# Patient Record
Sex: MALE | Race: WHITE
[De-identification: names, ages, dates, MRNs, and addresses within clinical notes are randomized per-mention and may not be internally consistent; named-entity substitution may affect disease eponyms.]

---

## 2020-08-11 ENCOUNTER — HOSPITAL ENCOUNTER (EMERGENCY)
Dept: HOSPITAL 41 - JD.ED | Age: 80
Discharge: HOME | End: 2020-08-11
Payer: MEDICARE

## 2020-08-11 DIAGNOSIS — Z72.3: ICD-10-CM

## 2020-08-11 DIAGNOSIS — R56.9: ICD-10-CM

## 2020-08-11 DIAGNOSIS — Z79.899: ICD-10-CM

## 2020-08-11 DIAGNOSIS — M62.81: ICD-10-CM

## 2020-08-11 DIAGNOSIS — Z04.3: Primary | ICD-10-CM
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
## 2020-08-11 NOTE — EDM.PDOC
ED HPI GENERAL MEDICAL PROBLEM





- General


Chief Complaint: General


Stated Complaint: [facility]


Time Seen by Provider: 08/11/20 11:48


Source of Information: Reports: Patient, Nursing Home Records (report from 

[facility]), RN Notes Reviewed


History Limitations: Reports: No Limitations





- History of Present Illness


INITIAL COMMENTS - FREE TEXT/NARRATIVE: 





Patient is a 79-year-old male who presents to the ED for the evaluation of a 

fall.  Patient was brought by [place] ambulance service, and resides at 

[facility] in [place].  Patient is not a great historian, he 

does provide some information, like he fell in the elevator, but he denies any 

sort of ongoing illness that he has been having.  Report from [facility] staff 

states that he has had generalized weakness for the past couple weeks, and has 

been going to physical therapy for this.  He did collapse or fall in the 

elevator today, but is denying pain anywhere, and is not on any sort of blood 

thinners, and he was not known to have hit his head.  Staff note that he is 

normally alert and oriented x3.  On initial exam, the patient does answer 

questions when prompted, sometimes he answers questions correctly, and other 

times he answers with a complete off the cuff answer.  He does have weakened 

 strength bilaterally, and states he just feeling generally more weak.  He 

thinks his legs just gave out on him today, noted it was a little bit harder to 

get up off the floor.  He notes lingering problems with his balance, as he has a

history of seizures.  Patient's not been known to have any fevers or chills, no 

cough or shortness of breath, or any sort of nausea vomiting or diarrhea.





- Related Data


                                    Allergies











Allergy/AdvReac Type Severity Reaction Status Date / Time


 


No Known Allergies Allergy   Verified 08/11/20 11:54











Home Meds: 


                                    Home Meds





Phenytoin Sodium Extended [Dilantin] 100 - 150 mg PO BID 08/11/20 [History]


Primidone [Mysoline] 250 mg PO BID 08/11/20 [History]


lisinopriL [Lisinopril] 20 mg PO DAILY 08/11/20 [History]











Past Medical History


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


Neurological History: Reports: Seizure





Social & Family History





- Living Situation & Occupation


Living situation: Reports: Extended Care Facility ([facility])





ED ROS GENERAL





- Review of Systems


Review Of Systems: Comprehensive ROS is negative, except as noted in HPI.





ED EXAM, GENERAL





- Physical Exam


Exam: See Below


Exam Limited By: No Limitations


General Appearance: Alert, WD/WN, No Apparent Distress


Ears: Normal External Exam


Nose: Normal Inspection


Throat/Mouth: Normal Inspection


Head: Atraumatic, Normocephalic


Neck: Normal Inspection


Respiratory/Chest: No Respiratory Distress, Lungs Clear, Normal Breath Sounds, 

No Accessory Muscle Use, Chest Non-Tender


Cardiovascular: Normal Peripheral Pulses, Regular Rate, Rhythm, No Murmur


Peripheral Pulses: 2+: Radial (L), Radial (R)


Extremities: Normal Inspection, Normal Capillary Refill


Neurological: Alert, Oriented, Sensory/Motor Deficit (decreased  strength 

bilaterally, overall generalized weakness noted)


Psychiatric: Normal Affect, Normal Mood


Skin Exam: Warm, Dry, Intact, Normal Color, No Rash





EKG INTERPRETATION


EKG Date: 08/11/20


Time: 12:36


Rhythm: NSR (sinus bradycardia)


Rate (Beats/Min): 52


Axis: Normal


P-Wave: Present (1 AV block)


QRS: Normal


ST-T: Other (T-wave flattening in lead I and inverted in aVL)


QT: Normal


Comparison: NA - No Prior EKG


EKG Interpretation Comments: 





No obvious ischemia or acute ST changes noted, reviewed by myself and Dr. Dunn.





Course





- Vital Signs


Last Recorded V/S: 


                                Last Vital Signs











Temp  98.3 F   08/11/20 11:47


 


Pulse  62   08/11/20 11:47


 


Resp  18   08/11/20 11:47


 


BP  137/75   08/11/20 11:47


 


Pulse Ox  94 L  08/11/20 11:47














- Orders/Labs/Meds


Orders: 


                               Active Orders 24 hr











 Category Date Time Status


 


 EKG Documentation Completion [RC] STAT Care  08/11/20 11:56 Active


 


 Peripheral IV Care [RC] .AS DIRECTED Care  08/11/20 12:07 Active


 


 Chest 2V [CR] Stat Exams  08/11/20 11:55 Taken


 


 Sodium Chloride 0.9% [Saline Flush] Med  08/11/20 12:06 Active





 10 ml FLUSH ASDIRECTED PRN   


 


 Peripheral IV Insertion Adult [OM.PC] Routine Oth  08/11/20 12:07 Ordered








                                Medication Orders





Sodium Chloride (Saline Flush)  10 ml FLUSH ASDIRECTED PRN


   PRN Reason: Keep Vein Open








Labs: 


                                Laboratory Tests











  08/11/20 08/11/20 08/11/20 Range/Units





  12:23 12:23 13:29 


 


WBC  3.88 L    (4.23-9.07)  K/mm3


 


RBC  4.27 L    (4.63-6.08)  M/mm3


 


Hgb  12.7 L    (13.7-17.5)  gm/dl


 


Hct  39.6 L    (40.1-51.0)  %


 


MCV  92.7 H    (79.0-92.2)  fl


 


MCH  29.7    (25.7-32.2)  pg


 


MCHC  32.1 L    (32.2-35.5)  g/dl


 


RDW Std Deviation  47.5 H    (35.1-43.9)  fL


 


Plt Count  180    (163-337)  K/mm3


 


MPV  11.6    (9.4-12.3)  fl


 


Neut % (Auto)  66.2    (34.0-67.9)  %


 


Lymph % (Auto)  19.1 L    (21.8-53.1)  %


 


Mono % (Auto)  11.3    (5.3-12.2)  %


 


Eos % (Auto)  2.8    (0.8-7.0)  


 


Baso % (Auto)  0.3    (0.1-1.2)  %


 


Neut # (Auto)  2.57    (1.78-5.38)  K/mm3


 


Lymph # (Auto)  0.74 L    (1.32-3.57)  K/mm3


 


Mono # (Auto)  0.44    (0.30-0.82)  K/mm3


 


Eos # (Auto)  0.11    (0.04-0.54)  K/mm3


 


Baso # (Auto)  0.01    (0.01-0.08)  K/mm3


 


Sodium   139   (136-145)  mEq/L


 


Potassium   4.3   (3.5-5.1)  mEq/L


 


Chloride   104   ()  mEq/L


 


Carbon Dioxide   30   (21-32)  mEq/L


 


Anion Gap   9.3   (5-15)  


 


BUN   16   (7-18)  mg/dL


 


Creatinine   0.9   (0.7-1.3)  mg/dL


 


Est Cr Clr Drug Dosing   65.97   mL/min


 


Estimated GFR (MDRD)   > 60   (>60)  mL/min


 


BUN/Creatinine Ratio   17.8   (14-18)  


 


Glucose   92   ()  mg/dL


 


Calcium   8.6   (8.5-10.1)  mg/dL


 


Magnesium   1.8   (1.8-2.4)  mg/dl


 


Total Bilirubin   0.3   (0.2-1.0)  mg/dL


 


AST   23   (15-37)  U/L


 


ALT   25   (16-63)  U/L


 


Alkaline Phosphatase   120 H   ()  U/L


 


Troponin I   < 0.017   (0.00-0.056)  ng/mL


 


Total Protein   6.3 L   (6.4-8.2)  g/dl


 


Albumin   3.3 L   (3.4-5.0)  g/dl


 


Globulin   3.0   gm/dL


 


Albumin/Globulin Ratio   1.1   (1-2)  


 


Urine Color    Yellow  (Yellow)  


 


Urine Appearance    Clear  (Clear)  


 


Urine pH    7.0  (5.0-8.0)  


 


Ur Specific Gravity    1.020  (1.005-1.030)  


 


Urine Protein    Negative  (Negative)  


 


Urine Glucose (UA)    Negative  (Negative)  


 


Urine Ketones    Negative  (Negative)  


 


Urine Occult Blood    Trace-intact H  (Negative)  


 


Urine Nitrite    Negative  (Negative)  


 


Urine Bilirubin    Negative  (Negative)  


 


Urine Urobilinogen    1.0  (0.2-1.0)  


 


Ur Leukocyte Esterase    Negative  (Negative)  


 


Urine RBC    5-10 H  (0-5)  /hpf


 


Urine WBC    0-5  (0-5)  /hpf


 


Ur Squamous Epith Cells    0-5  (0-5)  /hpf


 


Urine Bacteria    Few  (FEW)  /hpf


 


Urine Mucus    Rare  (FEW)  /hpf











Meds: 


Medications











Generic Name Dose Route Start Last Admin





  Trade Name Freq  PRN Reason Stop Dose Admin


 


Sodium Chloride  10 ml  08/11/20 12:06 





  Saline Flush  FLUSH  





  ASDIRECTED PRN  





  Keep Vein Open  














- Re-Assessments/Exams


Free Text/Narrative Re-Assessment/Exam: 





08/11/20 12:14


Patient presents to the ED for the evaluation of a fall, and generalized 

weakness.  Laboratory evaluation be obtained to rule out any electrolyte 

abnormalities, patient will have a chest x-ray, EKG, along with a urinalysis to 

rule out further possible infectious processes that would be causing increased 

weakness.  I do believe on exam however the patient is suffering from 

generalized deconditioning, or aging.





08/11/20 13:07


Chest x-ray is negative for any acute consolidative processes like pneumonia.  

There is some possible mild atelectasis at the right lung base, and heart is at 

the upper limits of normal for size.  Official radiology read is pending.  X-

rays were reviewed by myself and Dr. Dunn.  Labs are fairly unremarkable, 

white blood cell count is mildly low at 3.88.  Metabolic panel demonstrates no 

electrolyte abnormalities that would be causing weakness.  Trop is negative, EKG

 does not show any sign of acute ST changes.  As stated above I do believe the 

patient suffering from physical deconditioning, however urinalysis is still 

pending at this time.





08/11/20 14:01


Urine demonstrates a trace amount of cells in his urine, which likely secondary 

to trauma due to the insertion of the urinary catheter.  Patient will be 

discharged back to [facility] with general recommendations at this time.











Departure





- Departure


Time of Disposition: 14:02


Disposition: Home, Self-Care 01


Condition: Good


Clinical Impression: 


 Generalized muscle weakness, Physical deconditioning





Fall


Qualifiers:


 Encounter type: initial encounter Qualified Code(s): W19.XXXA - Unspecified 

fall, initial encounter








- Discharge Information


*PRESCRIPTION DRUG MONITORING PROGRAM REVIEWED*: No


*COPY OF PRESCRIPTION DRUG MONITORING REPORT IN PATIENT MOHAN: No


Instructions:  Weakness, Easy-to-Read


Forms:  ED Department Discharge


Additional Instructions: 


You were evaluated in the ER today regarding your generalized weakness, and fall

 x1 today.





An extensive work-up was done today to include lab work, chest x-ray, EKG all of

 this was negative.  There is no sign of an infective process causing any sort 

of weakness or anything that would have attributed to your fall.





Your fall is most likely due to physical deconditioning, and the aging process, 

please continue to work with PT to get stronger at this time.





Please return to the ER at any time if your symptoms change or worsen.





Sepsis Event Note (ED)





- Evaluation


Sepsis Screening Result: No Definite Risk





- Focused Exam


Vital Signs: 


                                   Vital Signs











  Temp Pulse Resp BP Pulse Ox


 


 08/11/20 11:47  98.3 F  62  18  137/75  94 L














- My Orders


Last 24 Hours: 


My Active Orders





08/11/20 11:55


Chest 2V [CR] Stat 





08/11/20 11:56


EKG Documentation Completion [RC] STAT 





08/11/20 12:06


Sodium Chloride 0.9% [Saline Flush]   10 ml FLUSH ASDIRECTED PRN 





08/11/20 12:07


Peripheral IV Care [RC] .AS DIRECTED 


Peripheral IV Insertion Adult [OM.PC] Routine 














- Assessment/Plan


Last 24 Hours: 


My Active Orders





08/11/20 11:55


Chest 2V [CR] Stat 





08/11/20 11:56


EKG Documentation Completion [RC] STAT 





08/11/20 12:06


Sodium Chloride 0.9% [Saline Flush]   10 ml FLUSH ASDIRECTED PRN 





08/11/20 12:07


Peripheral IV Care [RC] .AS DIRECTED 


Peripheral IV Insertion Adult [OM.PC] Routine

## 2020-08-12 NOTE — CR
Chest: 2 views of the chest were obtained.

 

Comparison: Prior chest x-ray of 05/31/13.

 

Heart is slightly enlarged.  Tortuous thoracic aorta is noted.  Slight

 atelectasis is noted within both lung bases.  Lungs otherwise are 

clear with no acute parenchymal change.  Right sided jugular line 

appears to be present.  Bony structures appear osteopenic.  Nothing 

acute is seen.

 

Impression:

1.  Slight bibasilar atelectasis.

2.  Right jugular line appears to be present.

3.  Nothing acute is appreciated.

 

Diagnostic code #2

 

This report was dictated in MDT

## 2020-08-15 ENCOUNTER — HOSPITAL ENCOUNTER (EMERGENCY)
Dept: HOSPITAL 41 - JD.ED | Age: 80
Discharge: HOME | End: 2020-08-15
Payer: MEDICARE

## 2020-08-15 DIAGNOSIS — M62.9: ICD-10-CM

## 2020-08-15 DIAGNOSIS — I10: ICD-10-CM

## 2020-08-15 DIAGNOSIS — R06.02: ICD-10-CM

## 2020-08-15 DIAGNOSIS — H54.3: ICD-10-CM

## 2020-08-15 DIAGNOSIS — M16.0: ICD-10-CM

## 2020-08-15 DIAGNOSIS — T42.0X5A: ICD-10-CM

## 2020-08-15 DIAGNOSIS — M17.0: ICD-10-CM

## 2020-08-15 DIAGNOSIS — G93.2: Primary | ICD-10-CM

## 2020-08-15 DIAGNOSIS — Z79.899: ICD-10-CM

## 2020-08-15 PROCEDURE — 93005 ELECTROCARDIOGRAM TRACING: CPT

## 2020-08-15 PROCEDURE — 85025 COMPLETE CBC W/AUTO DIFF WBC: CPT

## 2020-08-15 PROCEDURE — 86140 C-REACTIVE PROTEIN: CPT

## 2020-08-15 PROCEDURE — 84484 ASSAY OF TROPONIN QUANT: CPT

## 2020-08-15 PROCEDURE — 36415 COLL VENOUS BLD VENIPUNCTURE: CPT

## 2020-08-15 PROCEDURE — 80053 COMPREHEN METABOLIC PANEL: CPT

## 2020-08-15 PROCEDURE — 83880 ASSAY OF NATRIURETIC PEPTIDE: CPT

## 2020-08-15 PROCEDURE — 81001 URINALYSIS AUTO W/SCOPE: CPT

## 2020-08-15 PROCEDURE — 96360 HYDRATION IV INFUSION INIT: CPT

## 2020-08-15 PROCEDURE — 83735 ASSAY OF MAGNESIUM: CPT

## 2020-08-15 PROCEDURE — 99285 EMERGENCY DEPT VISIT HI MDM: CPT

## 2020-08-15 PROCEDURE — 70450 CT HEAD/BRAIN W/O DYE: CPT

## 2020-08-15 PROCEDURE — 96361 HYDRATE IV INFUSION ADD-ON: CPT

## 2020-08-15 PROCEDURE — 85652 RBC SED RATE AUTOMATED: CPT

## 2020-08-15 PROCEDURE — 80185 ASSAY OF PHENYTOIN TOTAL: CPT

## 2020-08-15 NOTE — EDM.PDOC
ED HPI GENERAL MEDICAL PROBLEM





- General


Chief Complaint: General


Stated Complaint: [facility]


Time Seen by Provider: 08/15/20 15:21


Source of Information: Reports: Patient


History Limitations: Reports: No Limitations





- History of Present Illness


INITIAL COMMENTS - FREE TEXT/NARRATIVE: 


79-year-old male presents to the ED for evaluation of a fall outside.  Patient 

has been falling a lot more recently due to problems with his legs.  He is 

currently in a physical therapy program to try and improve his balance and his 

stamina due to deconditioning of his lower extremities.  By history the patient 

has benign intracranial hypertension and has bur holes in both posterior aspects

of his scalp and left parietal scalp.  He states they replaced 45 years ago.  He

denies any injuries from today's falls.  He was evaluated through the ED a few 

days ago for fall and no ID problems were identified in terms of metabolic 

abnormalities.  He usually walks with the aid of a walker.  Not use a cane.  He 

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 

spontaneous retinal hemorrhage in that eye a many years ago.  She does have a 

seizure disorder and is currently on Dilantin which he states he has been on for

the last 45 years.  No recent changes in dosage.  Patient is also on primidone 

or Mysoline 150 mg twice daily as well.





Onset: Today, Sudden


Onset Date: 08/15/20


Onset Time: 14:00


Duration: Minutes:, Other (No apparent injuries identified.)


Location: Reports: Other (No apparent injuries identified.)


Quality: Reports: Other (Current falls at place of residence [facility].)


Severity: Moderate


Improves with: Reports: None


Worsens with: Reports: Other (And has chronic problems with his balance and)


Context: Denies: Activity, Exercise ( gait due to weakness of the lower 

extremities.), Lifting, Sick Contact, Trauma, Other


Associated Symptoms: Reports: No Other Symptoms.  Denies: Chest Pain, 

Fever/Chills


Treatments PTA: Reports: Other (see below) (1.)





- Related Data


                                    Allergies











Allergy/AdvReac Type Severity Reaction Status Date / Time


 


No Known Allergies Allergy   Verified 08/11/20 11:54











Home Meds: 


                                    Home Meds





Phenytoin Sodium Extended [Dilantin] 100 - 150 mg PO BID 08/11/20 [History]


Primidone [Mysoline] 250 mg PO BID 08/11/20 [History]


lisinopriL [Lisinopril] 20 mg PO DAILY 08/11/20 [History]


Phenytoin Sodium Extended [Dilantin] 150 mg PO QPM 08/15/20 [History]











Past Medical History


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


Cardiovascular History: Reports: Hypertension


Neurological History: Reports: Seizure (History of seizure disorder.  History of

 benign intracranial hypertension and has shunts in bur holes in both posterior 

aspects of his parietal scalp.)





- Past Surgical History


HEENT Surgical History: Reports: Detached Retina





Social & Family History





- Living Situation & Occupation


Living situation: Reports: Extended Care Facility ([facility])





ED ROS GENERAL





- Review of Systems


Review Of Systems: See Below


Constitutional: Reports: Malaise, Weakness, Fatigue, Other (His weight is been 

150 pounds forever.).  Denies: Fever, Chills, Decreased Appetite, Weight Loss


HEENT: Reports: Glasses, Other (9S left eye)


Respiratory: Reports: Shortness of Breath.  Denies: Wheezing, Pleuritic Chest 

Pain, Cough, Sputum


Cardiovascular: Reports: Blood Pressure Problem, Dyspnea on Exertion.  Denies: 

Chest Pain, Claudication, Edema, Lightheadedness, Orthopnea


Endocrine: Reports: Fatigue (Times)


GI/Abdominal: Denies: Constipation, Diarrhea


: Reports: Frequency, Other (.  X3.  Known BPH.)


Musculoskeletal: Reports: Joint Pain (Hips knees low back pain.), Other 

(Difficulty walking due to leg weakness.  Uses a walker to aid his gait)


Skin: Reports: No Symptoms


Neurological: Reports: Difficulty Walking


Psychiatric: Reports: No Symptoms


Hematologic/Lymphatic: Reports: No Symptoms


Immunologic: Reports: No Symptoms





ED EXAM, GENERAL





- Physical Exam


Exam: See Below


Exam Limited By: No Limitations


General Appearance: Alert, WD/WN, No Apparent Distress, Other (Temperature is 

36.6.  Pulse is 93 and sinus respiratory is 18 sats are 95% on room air)


Eye Exam: Left Eye: Abnormal Pupil (Patient has scarring and opaqueness of his 

left cornea and is blind in the left eye after retinal detachment occurred from 

a grand mal convulsion many years ago.), Bilateral Eye: PERRL


Throat/Mouth: Normal Inspection, Normal Lips, Normal Oropharynx


Head: Atraumatic, Normocephalic, Other (He has evidence of bur holes both 

posterior aspects of his parietal scalp.)


Neck: Limited Range of Motion, Tender Lateral (Bilateral aspect of the cervical 

spine due to osteoarthritic change.  He states it is like this all the time.).  

No: Lymphadenopathy (L), Lymphadenopathy (R)


Respiratory/Chest: No Respiratory Distress, Lungs Clear, Normal Breath Sounds, 

No Accessory Muscle Use, Decreased Breath Sounds (Creased breath sounds to the 

lower 35% of lung fields due to his mild COPD clinically.).  No: Rhonchi, 

Wheezing


Cardiovascular: Regular Rate, Rhythm, No Edema, No Gallop, No Murmur, No Rub.  

No: Normal Peripheral Pulses


Peripheral Pulses: 2+: Posterior Tibial (L), Posterior Tibial (R), Dorsalis 

Pedis (L), Dorsalis Pedis (R), 3+: Carotid (L), Carotid (R)


GI/Abdominal: Normal Bowel Sounds, Soft, Non-Tender, No Organomegaly


Back Exam: Normal Inspection, Decreased Range of Motion, Paraspinal Tenderness 

(Stiffness throughout the lumbar spine.  Adjacent to the lumbar spine 

bilaterally.).  No: Full Range of Motion


Extremities: Other (Patient has marked osteoarthritic changes in both hips with 

very limited external and internal rotation on both sides.  He has arthritic 

changes in both knees.  He walks with a slightly widened gait and shuffling type

 gait.)


Neurological: Alert (No real signs of bradykinesia to suggest Parkinson's 

disease.), Oriented, CN II-XII Intact, Normal Cognition


Psychiatric: Normal Affect, Normal Mood


Skin Exam: Warm, Dry, Intact, Normal Color, No Rash





EKG INTERPRETATION


EKG Date: 08/15/20


Time: 15:33


Rhythm: Other


Rate (Beats/Min): 57


Axis: Normal


P-Wave: Present


QRS: Other (Initial poor R wave progression.)


ST-T: Other (Wandering baseline leads I to III, aVL and aVF make it difficult to

 interpret limb leads.)


QT: Normal


EKG Interpretation Comments: 





Abnormal ECG





Course





- Vital Signs


Last Recorded V/S: 


                                Last Vital Signs











Temp  36.6 C   08/15/20 15:04


 


Pulse  93   08/15/20 15:04


 


Resp  18   08/15/20 15:04


 


BP      


 


Pulse Ox  93 L  08/15/20 15:04








                                        





Orthostatic Blood Pressure [     145/99


Standing]                        


Orthostatic Blood Pressure [     141/84


Supine]                          











- Orders/Labs/Meds


Orders: 


                               Active Orders 24 hr











 Category Date Time Status


 


 EKG Documentation Completion [RC] STAT Care  08/15/20 15:23 Active


 


 Orthostatic Vital Signs [RC] ASDIRECTED Care  08/15/20 15:23 Active


 


 Head wo Cont [CT] Stat Exams  08/15/20 15:24 Taken


 


 PHENYTOIN [REF] Stat Lab  08/15/20 15:35 Received


 


 Dextrose 5%-0.9% NaCl [Dextrose 5%-Normal Saline] 1,000 Med  08/15/20 15:30 

Active





 ml   





 IV ASDIRECTED   








                                Medication Orders





Dextrose/Sodium Chloride (Dextrose 5%-Normal Saline)  1,000 mls @ 250 mls/hr IV 

ASDIRECTED LUIS A


   Last Admin: 08/15/20 15:38  Dose: 250 mls/hr


   Documented by: ASTRID








Labs: 


                                Laboratory Tests











  08/15/20 08/15/20 08/15/20 Range/Units





  15:47 15:47 15:47 


 


WBC  3.52 L    (4.23-9.07)  K/mm3


 


RBC  4.45 L    (4.63-6.08)  M/mm3


 


Hgb  13.1 L    (13.7-17.5)  gm/dl


 


Hct  41.2    (40.1-51.0)  %


 


MCV  92.6 H    (79.0-92.2)  fl


 


MCH  29.4    (25.7-32.2)  pg


 


MCHC  31.8 L    (32.2-35.5)  g/dl


 


RDW Std Deviation  47.4 H    (35.1-43.9)  fL


 


Plt Count  179    (163-337)  K/mm3


 


MPV  11.6    (9.4-12.3)  fl


 


Neut % (Auto)  60.0    (34.0-67.9)  %


 


Lymph % (Auto)  22.4    (21.8-53.1)  %


 


Mono % (Auto)  13.9 H    (5.3-12.2)  %


 


Eos % (Auto)  3.1    (0.8-7.0)  


 


Baso % (Auto)  0.3    (0.1-1.2)  %


 


Neut # (Auto)  2.11    (1.78-5.38)  K/mm3


 


Lymph # (Auto)  0.79 L    (1.32-3.57)  K/mm3


 


Mono # (Auto)  0.49    (0.30-0.82)  K/mm3


 


Eos # (Auto)  0.11    (0.04-0.54)  K/mm3


 


Baso # (Auto)  0.01    (0.01-0.08)  K/mm3


 


ESR    5  (0-15)  mm/hr


 


Sodium   139   (136-145)  mEq/L


 


Potassium   4.3   (3.5-5.1)  mEq/L


 


Chloride   104   ()  mEq/L


 


Carbon Dioxide   30   (21-32)  mEq/L


 


Anion Gap   9.3   (5-15)  


 


BUN   16   (7-18)  mg/dL


 


Creatinine   0.9   (0.7-1.3)  mg/dL


 


Est Cr Clr Drug Dosing   65.97   mL/min


 


Estimated GFR (MDRD)   > 60   (>60)  mL/min


 


BUN/Creatinine Ratio   17.8   (14-18)  


 


Glucose   101   ()  mg/dL


 


Calcium   8.5   (8.5-10.1)  mg/dL


 


Magnesium   1.8   (1.8-2.4)  mg/dl


 


Total Bilirubin   0.3   (0.2-1.0)  mg/dL


 


AST   26   (15-37)  U/L


 


ALT   24   (16-63)  U/L


 


Alkaline Phosphatase   125 H   ()  U/L


 


Troponin I   < 0.017   (0.00-0.056)  ng/mL


 


C-Reactive Protein   1.4 H*   (<1.0)  mg/dL


 


NT-Pro-B Natriuret Pep     (0-450)  pg/mL


 


Total Protein   6.6   (6.4-8.2)  g/dl


 


Albumin   3.4   (3.4-5.0)  g/dl


 


Globulin   3.2   gm/dL


 


Albumin/Globulin Ratio   1.1   (1-2)  


 


Urine Color     (Yellow)  


 


Urine Appearance     (Clear)  


 


Urine pH     (5.0-8.0)  


 


Ur Specific Gravity     (1.005-1.030)  


 


Urine Protein     (Negative)  


 


Urine Glucose (UA)     (Negative)  


 


Urine Ketones     (Negative)  


 


Urine Occult Blood     (Negative)  


 


Urine Nitrite     (Negative)  


 


Urine Bilirubin     (Negative)  


 


Urine Urobilinogen     (0.2-1.0)  


 


Ur Leukocyte Esterase     (Negative)  


 


Urine RBC     (0-5)  /hpf


 


Urine WBC     (0-5)  /hpf


 


Ur Squamous Epith Cells     (0-5)  /hpf


 


Urine Bacteria     (FEW)  /hpf


 


Urine Mucus     (FEW)  /hpf














  08/15/20 08/15/20 Range/Units





  15:47 17:17 


 


WBC    (4.23-9.07)  K/mm3


 


RBC    (4.63-6.08)  M/mm3


 


Hgb    (13.7-17.5)  gm/dl


 


Hct    (40.1-51.0)  %


 


MCV    (79.0-92.2)  fl


 


MCH    (25.7-32.2)  pg


 


MCHC    (32.2-35.5)  g/dl


 


RDW Std Deviation    (35.1-43.9)  fL


 


Plt Count    (163-337)  K/mm3


 


MPV    (9.4-12.3)  fl


 


Neut % (Auto)    (34.0-67.9)  %


 


Lymph % (Auto)    (21.8-53.1)  %


 


Mono % (Auto)    (5.3-12.2)  %


 


Eos % (Auto)    (0.8-7.0)  


 


Baso % (Auto)    (0.1-1.2)  %


 


Neut # (Auto)    (1.78-5.38)  K/mm3


 


Lymph # (Auto)    (1.32-3.57)  K/mm3


 


Mono # (Auto)    (0.30-0.82)  K/mm3


 


Eos # (Auto)    (0.04-0.54)  K/mm3


 


Baso # (Auto)    (0.01-0.08)  K/mm3


 


ESR    (0-15)  mm/hr


 


Sodium    (136-145)  mEq/L


 


Potassium    (3.5-5.1)  mEq/L


 


Chloride    ()  mEq/L


 


Carbon Dioxide    (21-32)  mEq/L


 


Anion Gap    (5-15)  


 


BUN    (7-18)  mg/dL


 


Creatinine    (0.7-1.3)  mg/dL


 


Est Cr Clr Drug Dosing    mL/min


 


Estimated GFR (MDRD)    (>60)  mL/min


 


BUN/Creatinine Ratio    (14-18)  


 


Glucose    ()  mg/dL


 


Calcium    (8.5-10.1)  mg/dL


 


Magnesium    (1.8-2.4)  mg/dl


 


Total Bilirubin    (0.2-1.0)  mg/dL


 


AST    (15-37)  U/L


 


ALT    (16-63)  U/L


 


Alkaline Phosphatase    ()  U/L


 


Troponin I    (0.00-0.056)  ng/mL


 


C-Reactive Protein    (<1.0)  mg/dL


 


NT-Pro-B Natriuret Pep  132   (0-450)  pg/mL


 


Total Protein    (6.4-8.2)  g/dl


 


Albumin    (3.4-5.0)  g/dl


 


Globulin    gm/dL


 


Albumin/Globulin Ratio    (1-2)  


 


Urine Color   Yellow  (Yellow)  


 


Urine Appearance   Clear  (Clear)  


 


Urine pH   7.0  (5.0-8.0)  


 


Ur Specific Gravity   1.020  (1.005-1.030)  


 


Urine Protein   Negative  (Negative)  


 


Urine Glucose (UA)   Negative  (Negative)  


 


Urine Ketones   Negative  (Negative)  


 


Urine Occult Blood   Negative  (Negative)  


 


Urine Nitrite   Negative  (Negative)  


 


Urine Bilirubin   Negative  (Negative)  


 


Urine Urobilinogen   1.0  (0.2-1.0)  


 


Ur Leukocyte Esterase   Negative  (Negative)  


 


Urine RBC   0-5  (0-5)  /hpf


 


Urine WBC   Not seen  (0-5)  /hpf


 


Ur Squamous Epith Cells   0-5  (0-5)  /hpf


 


Urine Bacteria   Not seen  (FEW)  /hpf


 


Urine Mucus   Not seen  (FEW)  /hpf











Meds: 


Medications











Generic Name Dose Route Start Last Admin





  Trade Name Freq  PRN Reason Stop Dose Admin


 


Dextrose/Sodium Chloride  1,000 mls @ 250 mls/hr  08/15/20 15:30  08/15/20 15:38





  Dextrose 5%-Normal Saline  IV   250 mls/hr





  ASDIRECTED LUIS A   Administration














- Radiology Interpretation


Free Text/Narrative:: 


79-year-old male brought to the ED for evaluation of a fall once again.  Patient

is having a lot of falls recently according to history.  He is debilitated and 

lost a lot of muscle mass in his lower extremities.  He also has benign 

intracranial hypertension for which she is received shunt placement he 45 years 

ago in both parietal scalp's.  He is also blind in his left eye which 

contributes to his depth perception knocking him here in the hallway he 

preferred to hang onto a handrail versus walk without any gait aid.  He uses a 

walker at home.  Plan will be a routine lab work and CT head.  Chest was done a 

few days ago in the ED and will be reviewed but will not be repeated.  It was 

reportedly normal.








- Re-Assessments/Exams


Free Text/Narrative Re-Assessment/Exam: 





08/15/20 17:33 Lab test revealed a low white count at 3.52.  The differential is

60% neutrophils and 22% lymphocytes.  Hemoglobin is 13.1 with hematocrit of 41

.2.  MCV is slightly elevated at 92.6.  Platelet count is 179,000.  Sed rate is 

5.  Sodium 139 with a potassium of 4.3.  Chloride is 104 with a bicarb of 30.  

Anion gap is 9.3 BUN is 16 with a creatinine of 0.9.  GFR is greater than 60.  

Glucose is 101 with a calcium of 8.5.  Magnesium is normal at 1.8.  Liver 

function is normal.  Troponin I is less than 0.017.  C-reactive protein is 1.4. 

BNP is 132 total protein is 6.6.  Urinalysis is not yet available.





08/15/20 17:33 CT head has been completed and reveals no intracranial 

hemorrhage.  There are patchy areas of diminished attenuation in the 

periventricular and subcortical white matter, a nonspecific finding thought to 

most likely reflect chronic microvascular ischemic changes.  There is no acute 

edema, mass-effect or shift of the normal midline structures.  The gray-white 

matter differentiation is preserved.  There is no extra-axial fluid collections.

 The ventricles and sulci are diffusely dilated in keeping with age related 

atrophy of the brain.  On inspection of the scale there are bilateral drainage 

catheters which enter the skull through the posterior parietal placido holes.  The 

right-sided catheter terminates superficial to the posterior right parietal lobe

and the left-sided catheter terminates further anteriorly.  Superficial to the 

anterior aspect of the left parietal lobe.  No acute calvarial fracture is 

identified.  There are bilateral posterior parietal placido holes along with a left

lateral parietal placido hole.  Paranasal sinuses are normally aerated and normal 

mastoid air cells and middle ear cavities are normal as well.  High density 

material within the left eye or globe appears to be postsurgical in nature.





08/15/20 18:00: Nurse and I got the patient up walking in the hallway and he use

the banister to help keep his balance.  We did not have his walker with him.  He

managed fairly well but he has markedly difficulties lifting his legs due to 

weakness.  He would not be able to walk without the aid of a walker.  Analysis 

is now returned and is completely normal as well.  Because of his gait 

difficulties and falls is multifactorial.  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  He is 

on medications Dilantin and primidone both of which interfere with 

musculoskeletal function and cognitive function.  Phenobarbital level will will 

be a send out to make sure that he is not in the toxic range as this it would 

interfere with his ability to keep his balance.  The major problem however is 

organic brain disease with brain changes occurring secondary to benign 

intracranial hypertension and placement of shunts in the parietal skull 

bilaterally in the past he reports this occurred 45 years ago.  He is still on 

both of the above medications primidone and Dilantin for seizure control.  I 

will leave it up to his personal care physician to discern whether or not 

weaning him from 1 of these medications particularly the Dilantin to see if he 

has any breakthrough seizures and truly needs this medication.  There are no 

signs of many any metabolic abnormalities.  His abnormalities are that of 

physical deterioration due to age.  I strongly advise physical therapy to 

continue working with him for balance and lower extremity strengthening exercise

programs.














Departure





- Departure


Time of Disposition: 18:37


Disposition: Home, Self-Care 01


Condition: Fair


Clinical Impression: 


 Recurrent falls while walking, Benign intracranial hypertension syndrome, 

Adverse effects of medication, Severe muscle deconditioning, Osteoarthritis of 

hips, bilateral, Osteoarthritis of knees, bilateral





Blindness of left eye


Qualifiers:


 Right eye visual impairment category: right - category 2 Qualified Code(s): 

H54.3 - Unqualified visual loss, both eyes








- Discharge Information


*PRESCRIPTION DRUG MONITORING PROGRAM REVIEWED*: Not Applicable


*COPY OF PRESCRIPTION DRUG MONITORING REPORT IN PATIENT MOHAN: Not Applicable


Instructions:  Pseudotumor Cerebri


Referrals: 


Duncan Marquez MD [Primary Care Provider] - 


Forms:  ED Department Discharge


Additional Instructions: 


Evaluation in the emergency room today in regards to recurrent falls which seem 

to be progressively worsening over the last several weeks.  Fall again today 

without apparent injury identified.  Examination revealed no metabolic 

abnormalities in his blood to account for musculature weakness.  He has a 

multitude of problems including previous brain injury and development of 

intracranial hypertension requiring shunting in both bilateral parietal skull 

drill holes.  He requires medication for seizure disorder I primidone and 

Dilantin.  It is perhaps possible to wean him from Dilantin to see if he 

requires this medication and has no further seizure activity.  This may improve 

his cognitive and mobility.  He has advanced osteoarthritic changes in both hips

and both knees contributing to his disability walking.  He has marked 

deconditioning of the musculature in his lower extremities for which the 

physiotherapy program is working on.  At this time there is nothing that we have

to offer that would make him any safer with walking with anything else other 

than his walker.  Follow-up with his personal care physician as planned.





Sepsis Event Note (ED)





- Evaluation


Sepsis Screening Result: No Definite Risk





- Focused Exam


Vital Signs: 


                                   Vital Signs











  Temp Pulse Resp Pulse Ox


 


 08/15/20 15:04  36.6 C  93  18  93 L














- My Orders


Last 24 Hours: 


My Active Orders





08/15/20 15:23


EKG Documentation Completion [RC] STAT 


Orthostatic Vital Signs [RC] ASDIRECTED 





08/15/20 15:24


Head wo Cont [CT] Stat 





08/15/20 15:30


Dextrose 5%-0.9% NaCl [Dextrose 5%-Normal Saline] 1,000 ml IV ASDIRECTED 





08/15/20 15:35


PHENYTOIN [REF] Stat 














- Assessment/Plan


Last 24 Hours: 


My Active Orders





08/15/20 15:23


EKG Documentation Completion [RC] STAT 


Orthostatic Vital Signs [RC] ASDIRECTED 





08/15/20 15:24


Head wo Cont [CT] Stat 





08/15/20 15:30


Dextrose 5%-0.9% NaCl [Dextrose 5%-Normal Saline] 1,000 ml IV ASDIRECTED 





08/15/20 15:35


PHENYTOIN [REF] Stat

## 2020-08-16 NOTE — CT
Head CT

 

Technique: Multiple axial sections through the brain were obtained.  

Intravenous contrast was not utilized.

 

Comparison: Prior head CT study of 05/13/11.

 

Findings: Ventricles along with basal cisterns and sulci over the 

convexities are moderately prominent.  Catheters are seen within both 

cerebral convexities terminating in an extra-axial location.  Findings

 are fairly stable from previous exam.

 

Diminished density is noted within the periventricular and subcortical

 white matter which is most likely due to small vessel ischemic 

demyelination change.  Abnormal left globe is seen which appears as a 

chronic finding.

 

Minimal atherosclerotic calcification seen within the carotid siphon.

 

Bone window settings were reviewed which shows no acute calvarial 

finding.  Visualized mastoid sinuses and paranasal sinuses show 

nothing acute.

 

Impression:

1.  Stable appearing intracranial catheters over both convexities.

2.  Senescent change as noted above.

3.  No acute intracranial abnormality is appreciated.

 

Diagnostic code #2

 

This report was dictated in MDT

 

I agree with preliminary report from [facility], finalized on 08/15/20, 5:26

 PM Central Daylight Time

## 2020-09-08 ENCOUNTER — HOSPITAL ENCOUNTER (EMERGENCY)
Dept: HOSPITAL 41 - JD.ED | Age: 80
Discharge: HOME | End: 2020-09-08
Payer: MEDICARE

## 2020-09-08 DIAGNOSIS — I10: ICD-10-CM

## 2020-09-08 DIAGNOSIS — R53.1: Primary | ICD-10-CM

## 2020-09-08 DIAGNOSIS — Z79.899: ICD-10-CM

## 2020-09-08 DIAGNOSIS — R56.9: ICD-10-CM

## 2020-09-08 NOTE — CR
Chest: Portable view of the chest was obtained.

 

Comparison: Prior chest x-ray of 05/31/13.

 

Heart is within normal limits for portable technique.  Tortuous 

thoracic aorta is seen.  Minimal atelectasis/scarring is seen within 

the right base.  Lungs otherwise are clear.  Bony structures are 

osteopenic.

 

Impression:

1.  Findings as noted above.

2.  Nothing acute is appreciated.

 

Diagnostic code #2

 

This report was dictated in MDT

## 2020-09-08 NOTE — CT
Head CT

 

Technique: Multiple axial sections through the brain were obtained.  

Intravenous contrast was not utilized.

 

Comparison: Prior head CT study of 08/15/20.

 

Findings: Ventricles along the basal cisterns and sulci over the 

convexities are moderately prominent.  Diminished density is noted 

within the periventricular white matter which is felt compatible with 

small vessel ischemic demyelination change.  No evidence of 

intracranial hemorrhage.  No midline shift or mass-effect is seen.

 

Bone window settings were reviewed and shows no acute calvarial 

abnormality.  Bilateral extra-axial shunts are noted.  Visualized 

paranasal sinuses and mastoid sinuses show nothing acute.  No acute 

calvarial finding is seen.

 

Impression:

1.  Senescent change as noted above.  Extra-axial shunt catheters are 

in place.

2.  Nothing acute is appreciated on noncontrast head CT study.

 

Diagnostic code #2

 

This report was dictated in MDT

## 2020-09-08 NOTE — EDM.PDOC
ED HPI GENERAL MEDICAL PROBLEM





- General


Chief Complaint: Neuro Symptoms/Deficits


Stated Complaint: [facility]


Time Seen by Provider: 09/08/20 14:24


Source of Information: Reports: Patient, EMS, Nursing Home Records, RN Notes 

Reviewed





- History of Present Illness


INITIAL COMMENTS - FREE TEXT/NARRATIVE: 





79 yr old male sent to ED for eval of progressive weakness, lethargy.  This is 

reported to have started several weeks ago but worse the last few days. He does 

have hx of Htn. He is a long term resident at the [facility].  No 

cough, fever or difficulty breathing.  He is on dilantin in addition to other 

meds.  It appears a dilantin order was placed today at the NH in addition to a 

few other labs. 


Pt has no complaints on arrival to ED.  Speech is noted to be mildly slurred. 





- Related Data


                                    Allergies











Allergy/AdvReac Type Severity Reaction Status Date / Time


 


No Known Allergies Allergy   Verified 09/08/20 14:10











Home Meds: 


                                    Home Meds





Phenytoin Sodium Extended [Dilantin] 100 mg PO QAM 08/11/20 [History]


Primidone [Mysoline] 250 mg PO DAILY 08/11/20 [History]


lisinopriL [Lisinopril] 20 mg PO DAILY 08/11/20 [History]


Phenytoin Sodium Extended [Dilantin] 150 mg PO QPM 08/15/20 [History]











Past Medical History


HEENT History: Reports: Other (See Below)


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


Cardiovascular History: Reports: Hypertension


Neurological History: Reports: Seizure


Other Neuro History: intercranial hypertension





- Past Surgical History


HEENT Surgical History: Reports: Detached Retina





Social & Family History





- Tobacco Use


Smoking Status *Q: Never Smoker


Second Hand Smoke Exposure: No





- Caffeine Use


Caffeine Use: Reports: None





- Recreational Drug Use


Recreational Drug Use: No





- Living Situation & Occupation


Living situation: Reports: Extended Care Facility ([place] [facility] assisted living)





ED ROS GENERAL





- Review of Systems


Review Of Systems: See Below


Constitutional: Denies: Fever


HEENT: Reports: No Symptoms


Respiratory: Denies: Shortness of Breath, Cough


Cardiovascular: Denies: Chest Pain


GI/Abdominal: Denies: Abdominal Pain, Diarrhea, Vomiting


Musculoskeletal: Denies: Joint Pain


Skin: Denies: Rash


Neurological: Reports: Dizziness, Trouble Speaking (slurred speech, unsure what 

is baseline is), Weakness (generalized weakness)





ED EXAM, GENERAL





- Physical Exam


Exam: See Below


General Appearance: Alert, No Apparent Distress


Eye Exam: Right Eye: Other (R pupil mid sized, reactive, L eye scarred over)


Throat/Mouth: Normal Inspection, Other (no facial droop)


Head: No: Facial Swelling


Neck: Supple, Other (no JVD)


Respiratory/Chest: No Respiratory Distress, Lungs Clear, Normal Breath Sounds


Cardiovascular: Regular Rate, Rhythm


GI/Abdominal: Soft, Non-Tender


Back Exam: No: CVA Tenderness (L), CVA Tenderness (R)


Extremities: No: Pedal Edema, Leg Pain, Increased Warmth, Redness


Neurological: Alert, Other (Pt has generalized weakness, is not strong arms or 

legs,  but no focal weakness)


Skin Exam: Warm, Dry, Normal Color, No Rash





EKG INTERPRETATION


EKG Date: 09/08/20


Rhythm: NSR


Axis: Normal


P-Wave: Present


QRS: Normal


ST-T: Normal





Course





- Vital Signs


Last Recorded V/S: 


                                Last Vital Signs











Temp  97.5 F   09/08/20 18:40


 


Pulse  68   09/08/20 18:40


 


Resp  16   09/08/20 18:40


 


BP  118/81   09/08/20 18:40


 


Pulse Ox  95   09/08/20 18:40














- Orders/Labs/Meds


Orders: 


                               Active Orders 24 hr











 Category Date Time Status


 


 Insert Elizabeth Catheter [Insert Urinary Catheter] [OM.PC] Care  09/08/20 16:35 

Ordered





 Stat   


 


 Peripheral IV Insertion Adult [OM.PC] Stat Oth  09/08/20 14:32 Ordered











Labs: 


                                Laboratory Tests











  09/08/20 09/08/20 09/08/20 Range/Units





  14:50 14:50 14:50 


 


WBC   7.03   (4.23-9.07)  K/mm3


 


RBC   4.62 L   (4.63-6.08)  M/mm3


 


Hgb   13.7   (13.7-17.5)  gm/dl


 


Hct   43.7   (40.1-51.0)  %


 


MCV   94.6 H   (79.0-92.2)  fl


 


MCH   29.7   (25.7-32.2)  pg


 


MCHC   31.4 L   (32.2-35.5)  g/dl


 


RDW Std Deviation   48.6 H   (35.1-43.9)  fL


 


Plt Count   168   (163-337)  K/mm3


 


MPV   12.5 H   (9.4-12.3)  fl


 


Neut % (Auto)   69.2 H   (34.0-67.9)  %


 


Lymph % (Auto)   13.4 L   (21.8-53.1)  %


 


Mono % (Auto)   14.9 H   (5.3-12.2)  %


 


Eos % (Auto)   2.1   (0.8-7.0)  


 


Baso % (Auto)   0.3   (0.1-1.2)  %


 


Neut # (Auto)   4.86   (1.78-5.38)  K/mm3


 


Lymph # (Auto)   0.94 L   (1.32-3.57)  K/mm3


 


Mono # (Auto)   1.05 H   (0.30-0.82)  K/mm3


 


Eos # (Auto)   0.15   (0.04-0.54)  K/mm3


 


Baso # (Auto)   0.02   (0.01-0.08)  K/mm3


 


Sodium    147 H  (136-145)  mEq/L


 


Potassium    4.0  (3.5-5.1)  mEq/L


 


Chloride    109 H  ()  mEq/L


 


Carbon Dioxide    31  (21-32)  mEq/L


 


Anion Gap    11.0  (5-15)  


 


BUN    25 H  (7-18)  mg/dL


 


Creatinine    0.7  (0.7-1.3)  mg/dL


 


Est Cr Clr Drug Dosing    79.23  mL/min


 


Estimated GFR (MDRD)    > 60  (>60)  mL/min


 


BUN/Creatinine Ratio    35.7 H  (14-18)  


 


Glucose    102  ()  mg/dL


 


Lactic Acid     (0.4-2.0)  mmol/L


 


Calcium    8.9  (8.5-10.1)  mg/dL


 


Total Bilirubin    0.3  (0.2-1.0)  mg/dL


 


AST    48 H  (15-37)  U/L


 


ALT    33  (16-63)  U/L


 


Alkaline Phosphatase    131 H  ()  U/L


 


C-Reactive Protein  5.1 H*    (<1.0)  mg/dL


 


NT-Pro-B Natriuret Pep     (0-450)  pg/mL


 


Total Protein    6.8  (6.4-8.2)  g/dl


 


Albumin    3.1 L  (3.4-5.0)  g/dl


 


Globulin    3.7  gm/dL


 


Albumin/Globulin Ratio    0.8 L  (1-2)  


 


Urine Color     (Yellow)  


 


Urine Appearance     (Clear)  


 


Urine pH     (5.0-8.0)  


 


Ur Specific Gravity     (1.005-1.030)  


 


Urine Protein     (Negative)  


 


Urine Glucose (UA)     (Negative)  


 


Urine Ketones     (Negative)  


 


Urine Occult Blood     (Negative)  


 


Urine Nitrite     (Negative)  


 


Urine Bilirubin     (Negative)  


 


Urine Urobilinogen     (0.2-1.0)  


 


Ur Leukocyte Esterase     (Negative)  


 


Urine RBC     (0-5)  /hpf


 


Urine WBC     (0-5)  /hpf


 


Ur Squamous Epith Cells     (0-5)  /hpf


 


Urine Bacteria     (FEW)  /hpf


 


Urine Mucus     (FEW)  /hpf














  09/08/20 09/08/20 09/08/20 Range/Units





  14:50 14:50 16:35 


 


WBC     (4.23-9.07)  K/mm3


 


RBC     (4.63-6.08)  M/mm3


 


Hgb     (13.7-17.5)  gm/dl


 


Hct     (40.1-51.0)  %


 


MCV     (79.0-92.2)  fl


 


MCH     (25.7-32.2)  pg


 


MCHC     (32.2-35.5)  g/dl


 


RDW Std Deviation     (35.1-43.9)  fL


 


Plt Count     (163-337)  K/mm3


 


MPV     (9.4-12.3)  fl


 


Neut % (Auto)     (34.0-67.9)  %


 


Lymph % (Auto)     (21.8-53.1)  %


 


Mono % (Auto)     (5.3-12.2)  %


 


Eos % (Auto)     (0.8-7.0)  


 


Baso % (Auto)     (0.1-1.2)  %


 


Neut # (Auto)     (1.78-5.38)  K/mm3


 


Lymph # (Auto)     (1.32-3.57)  K/mm3


 


Mono # (Auto)     (0.30-0.82)  K/mm3


 


Eos # (Auto)     (0.04-0.54)  K/mm3


 


Baso # (Auto)     (0.01-0.08)  K/mm3


 


Sodium     (136-145)  mEq/L


 


Potassium     (3.5-5.1)  mEq/L


 


Chloride     ()  mEq/L


 


Carbon Dioxide     (21-32)  mEq/L


 


Anion Gap     (5-15)  


 


BUN     (7-18)  mg/dL


 


Creatinine     (0.7-1.3)  mg/dL


 


Est Cr Clr Drug Dosing     mL/min


 


Estimated GFR (MDRD)     (>60)  mL/min


 


BUN/Creatinine Ratio     (14-18)  


 


Glucose     ()  mg/dL


 


Lactic Acid   1.1   (0.4-2.0)  mmol/L


 


Calcium     (8.5-10.1)  mg/dL


 


Total Bilirubin     (0.2-1.0)  mg/dL


 


AST     (15-37)  U/L


 


ALT     (16-63)  U/L


 


Alkaline Phosphatase     ()  U/L


 


C-Reactive Protein     (<1.0)  mg/dL


 


NT-Pro-B Natriuret Pep  67    (0-450)  pg/mL


 


Total Protein     (6.4-8.2)  g/dl


 


Albumin     (3.4-5.0)  g/dl


 


Globulin     gm/dL


 


Albumin/Globulin Ratio     (1-2)  


 


Urine Color    Dark yellow  (Yellow)  


 


Urine Appearance    Clear  (Clear)  


 


Urine pH    5.5  (5.0-8.0)  


 


Ur Specific Gravity    > or = 1.030  (1.005-1.030)  


 


Urine Protein    Negative  (Negative)  


 


Urine Glucose (UA)    Negative  (Negative)  


 


Urine Ketones    Negative  (Negative)  


 


Urine Occult Blood    Trace-lysed H  (Negative)  


 


Urine Nitrite    Negative  (Negative)  


 


Urine Bilirubin    Negative  (Negative)  


 


Urine Urobilinogen    0.2  (0.2-1.0)  


 


Ur Leukocyte Esterase    Negative  (Negative)  


 


Urine RBC    0-5  (0-5)  /hpf


 


Urine WBC    0-5  (0-5)  /hpf


 


Ur Squamous Epith Cells    0-5  (0-5)  /hpf


 


Urine Bacteria    Few  (FEW)  /hpf


 


Urine Mucus    Moderate H  (FEW)  /hpf











Meds: 


Medications














Discontinued Medications














Generic Name Dose Route Start Last Admin





  Trade Name Freq  PRN Reason Stop Dose Admin


 


Sodium Chloride  10 ml  09/08/20 14:32  09/08/20 15:02





  Saline Flush  FLUSH   10 ml





  ASDIRECTED PRN   Administration





  Keep Vein Open  














- Re-Assessments/Exams


Free Text/Narrative Re-Assessment/Exam: 





09/08/20 19:48


CXR nl, Ua, WBC, other labs all relatively nl, CRP mildly elevated at 5.1.  He 

is reported to have had a neg covid 1 week ago.  He has not been coughing, Head 

CT no acute findings,  Have sent back to the [facility]. 

















Departure





- Departure


Time of Disposition: 17:17


Disposition: Home, Self-Care 01


Condition: Fair


Clinical Impression: 


 Generalized weakness








- Discharge Information


Instructions:  Weakness, Easy-to-Read


Referrals: 


Wallace Ojeda MD [Primary Care Provider] - 


Forms:  ED Department Discharge


Additional Instructions: 


Labs today including WBC, chemistries, Ua were all relatively nl today.  CXR was

clear.   Head CT showed no acute findings.  No evidence for acute stroke or 

infection found today.  I see that Dr Ojeda has ordered a dilantin level along 

with other labs.  It will be important to make sure that comes back normal.  

Continue present care for now.  Return to ED as needed. 





Sepsis Event Note (ED)





- Evaluation


Sepsis Screening Result: No Definite Risk





- My Orders


Last 24 Hours: 


My Active Orders





09/08/20 14:32


Peripheral IV Insertion Adult [OM.PC] Stat 





09/08/20 16:35


Insert Elizabeth Catheter [Insert Urinary Catheter] [OM.PC] Stat 














- Assessment/Plan


Last 24 Hours: 


My Active Orders





09/08/20 14:32


Peripheral IV Insertion Adult [OM.PC] Stat 





09/08/20 16:35


Insert Elizabeth Catheter [Insert Urinary Catheter] [OM.PC] Stat

## 2020-09-11 ENCOUNTER — HOSPITAL ENCOUNTER (INPATIENT)
Dept: HOSPITAL 41 - JD.ED | Age: 80
LOS: 3 days | Discharge: NURSING, REHAB OR LONG-TERM CARE | DRG: 689 | End: 2020-09-14
Attending: INTERNAL MEDICINE | Admitting: INTERNAL MEDICINE
Payer: MEDICARE

## 2020-09-11 DIAGNOSIS — R79.89: ICD-10-CM

## 2020-09-11 DIAGNOSIS — Z20.828: ICD-10-CM

## 2020-09-11 DIAGNOSIS — Z11.59: ICD-10-CM

## 2020-09-11 DIAGNOSIS — H54.40: ICD-10-CM

## 2020-09-11 DIAGNOSIS — E88.09: ICD-10-CM

## 2020-09-11 DIAGNOSIS — R53.1: ICD-10-CM

## 2020-09-11 DIAGNOSIS — R41.82: ICD-10-CM

## 2020-09-11 DIAGNOSIS — Z79.899: ICD-10-CM

## 2020-09-11 DIAGNOSIS — N30.01: Primary | ICD-10-CM

## 2020-09-11 DIAGNOSIS — D69.6: ICD-10-CM

## 2020-09-11 DIAGNOSIS — G40.909: ICD-10-CM

## 2020-09-11 DIAGNOSIS — I10: ICD-10-CM

## 2020-09-11 DIAGNOSIS — M17.0: ICD-10-CM

## 2020-09-11 DIAGNOSIS — E87.8: ICD-10-CM

## 2020-09-11 DIAGNOSIS — N39.0: ICD-10-CM

## 2020-09-11 DIAGNOSIS — I26.99: ICD-10-CM

## 2020-09-11 DIAGNOSIS — E87.0: ICD-10-CM

## 2020-09-11 DIAGNOSIS — Z66: ICD-10-CM

## 2020-09-11 DIAGNOSIS — R13.10: ICD-10-CM

## 2020-09-11 DIAGNOSIS — H54.3: ICD-10-CM

## 2020-09-11 DIAGNOSIS — R29.898: ICD-10-CM

## 2020-09-11 DIAGNOSIS — M16.0: ICD-10-CM

## 2020-09-11 DIAGNOSIS — D72.810: ICD-10-CM

## 2020-09-11 PROCEDURE — 71275 CT ANGIOGRAPHY CHEST: CPT

## 2020-09-11 PROCEDURE — 96361 HYDRATE IV INFUSION ADD-ON: CPT

## 2020-09-11 PROCEDURE — 82728 ASSAY OF FERRITIN: CPT

## 2020-09-11 PROCEDURE — 85027 COMPLETE CBC AUTOMATED: CPT

## 2020-09-11 PROCEDURE — 96365 THER/PROPH/DIAG IV INF INIT: CPT

## 2020-09-11 PROCEDURE — 81001 URINALYSIS AUTO W/SCOPE: CPT

## 2020-09-11 PROCEDURE — 71045 X-RAY EXAM CHEST 1 VIEW: CPT

## 2020-09-11 PROCEDURE — 80053 COMPREHEN METABOLIC PANEL: CPT

## 2020-09-11 PROCEDURE — 85007 BL SMEAR W/DIFF WBC COUNT: CPT

## 2020-09-11 PROCEDURE — 83615 LACTATE (LD) (LDH) ENZYME: CPT

## 2020-09-11 PROCEDURE — 86140 C-REACTIVE PROTEIN: CPT

## 2020-09-11 PROCEDURE — 93005 ELECTROCARDIOGRAM TRACING: CPT

## 2020-09-11 PROCEDURE — 83735 ASSAY OF MAGNESIUM: CPT

## 2020-09-11 PROCEDURE — 36415 COLL VENOUS BLD VENIPUNCTURE: CPT

## 2020-09-11 PROCEDURE — 87186 SC STD MICRODIL/AGAR DIL: CPT

## 2020-09-11 PROCEDURE — 83605 ASSAY OF LACTIC ACID: CPT

## 2020-09-11 PROCEDURE — 87040 BLOOD CULTURE FOR BACTERIA: CPT

## 2020-09-11 PROCEDURE — 87086 URINE CULTURE/COLONY COUNT: CPT

## 2020-09-11 PROCEDURE — 99285 EMERGENCY DEPT VISIT HI MDM: CPT

## 2020-09-11 PROCEDURE — 80185 ASSAY OF PHENYTOIN TOTAL: CPT

## 2020-09-11 PROCEDURE — 85610 PROTHROMBIN TIME: CPT

## 2020-09-11 PROCEDURE — U0002 COVID-19 LAB TEST NON-CDC: HCPCS

## 2020-09-11 PROCEDURE — 84145 PROCALCITONIN (PCT): CPT

## 2020-09-11 PROCEDURE — 87088 URINE BACTERIA CULTURE: CPT

## 2020-09-11 PROCEDURE — 70450 CT HEAD/BRAIN W/O DYE: CPT

## 2020-09-11 PROCEDURE — 85379 FIBRIN DEGRADATION QUANT: CPT

## 2020-09-11 PROCEDURE — 84484 ASSAY OF TROPONIN QUANT: CPT

## 2020-09-11 RX ADMIN — EXTENDED PHENYTOIN SODIUM SCH MG: 30 CAPSULE ORAL at 18:56

## 2020-09-11 NOTE — CT
Head CT

 

Technique: Multiple axial sections through the brain were obtained.  

Intravenous contrast was not utilized.

 

Comparison: Prior head CT study of 09/08/20.

 

Findings: Ventricles along with basal cisterns and sulci over the 

convexities are moderately prominent.  Diminished density is noted 

within the periventricular white matter compatible with small vessel 

ischemic demyelination change.  Atrophy includes the cerebellum.

 

No evidence of intracranial hemorrhage.  No midline shift or 

mass-effect is appreciated.

 

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 

which are stable.

 

Bone window settings were reviewed.  Visualized mastoid sinuses and 

visualized paranasal sinuses show nothing acute.  No acute calvarial 

finding is seen.  Bilateral subdural catheters are seen.

 

Impression:

1.  Senescent change as noted above.  Catheters are seen on both sides

 which are extra-axial in location and stable.

2.  No acute intracranial abnormality is appreciated.

 

Diagnostic code #2

 

This report was dictated in MDT

## 2020-09-11 NOTE — CT
CT chest

 

Technique: Multiple axial sections through the chest were obtained.  

Intravenous contrast was utilized.

 

Findings: Small filling defects are seen within the distal main, 

segmental and subsegmental anterior branch of the right lower 

pulmonary artery.  No other findings of pulmonary embolism are seen.

 

Aorta shows no aneurysm.  Atherosclerotic calcification is noted 

within the thoracic aorta.  Mediastinum and hilar regions show no 

adenopathy.  No pericardial thickening is seen.  

 

Several small low density lesions are seen within the liver most 

likely relating to small cysts.  

 

Increased density within the right lung base is seen.  Lesser density 

within the left lung base is noted.  Findings most likely represent 

atelectasis.  Slight scarring is noted within both lung apices.

 

Impression:

1.  Several small pulmonary emboli on the right side.

2.  Bibasilar atelectasis worse on the right side.

3.  Other findings believed to be incidental.

 

Diagnostic code #5

 

This report was dictated in MDT

## 2020-09-11 NOTE — EDM.PDOC
ED HPI GENERAL MEDICAL PROBLEM





- General


Chief Complaint: General


Stated Complaint: [facility]


Time Seen by Provider: 09/11/20 11:07


Source of Information: Reports: Patient, Old Records, RN Notes Reviewed


History Limitations: Reports: No Limitations





- History of Present Illness


INITIAL COMMENTS - FREE TEXT/NARRATIVE: 





Patient is a 79-year-old male who presents to the ED via [facility] for 

the evaluation of his generalized weakness, and decreased mentation.  Patient 

was last evaluated in this ER roughly 3 days ago, and there were no 

abnormalities found.  He was discharged back to [facility] with comfort.  He 

was admitted there on August 27, and staff reports that he was able to ambulate 

but has been slowly deteriorating, and not even holding his head up as of today.

 They note that he was running a fever of 102.0 F yesterday.  He received a 

Dulcolax suppository last night and they reported a watery coffee ground stool. 

The patient is alert when you talk with him, but he mostly just moderates and 

does not make any sense when answering questions.  I did ask him if he was 

having pain anywhere he shook his head no, I asked him if he knew why he was 

here he shook his head no, he has generalized weakness, no focal neurological 

abnormalities appreciated.  His temperature at time of triage is 97.2 F, pulse 

is 81%, respiratory rate of 18, blood pressure slightly low at 96/69, and O2 

sats of 94% on room air.  His primary care provider is Dr. Ojeda.





- Related Data


                                    Allergies











Allergy/AdvReac Type Severity Reaction Status Date / Time


 


No Known Allergies Allergy   Verified 09/11/20 11:03











Home Meds: 


                                    Home Meds





Phenytoin Sodium Extended [Dilantin] 100 mg PO QAM 08/11/20 [History]


Primidone [Mysoline] 250 mg PO BID 08/11/20 [History]


lisinopriL [Lisinopril] 20 mg PO DAILY 08/11/20 [History]


Phenytoin Sodium Extended [Dilantin] 150 mg PO QPM 08/15/20 [History]











Past Medical History


HEENT History: Reports: Other (See Below)


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


Cardiovascular History: Reports: Hypertension


Neurological History: Reports: Seizure


Other Neuro History: intercranial hypertension





- Past Surgical History


Head Surgeries/Procedures: Reports: Shunt


HEENT Surgical History: Reports: Detached Retina





Social & Family History





- Tobacco Use


Smoking Status *Q: Unknown Ever Smoked


Second Hand Smoke Exposure: No





- Caffeine Use


Caffeine Use: Reports: None





- Recreational Drug Use


Recreational Drug Use: No





- Living Situation & Occupation


Living situation: Reports: Extended Care Facility ([facility], 

[facility])





ED ROS GENERAL





- Review of Systems


Review Of Systems: Comprehensive ROS is negative, except as noted in HPI.





ED EXAM, GENERAL





- Physical Exam


Exam: See Below


Exam Limited By: No Limitations


General Appearance: Alert (pt is alert to questions, and shakes head 

appropriately but has muttered or garbled speech.), No Apparent Distress


Head: Other (slight bruising noted to lateral eye sockets, no facial pain is 

noted)


Respiratory/Chest: No Respiratory Distress, Lungs Clear, Normal Breath Sounds, 

No Accessory Muscle Use, Chest Non-Tender


Cardiovascular: Normal Peripheral Pulses, Regular Rate, Rhythm, No Murmur


Peripheral Pulses: 2+: Radial (L), Radial (R)


GI/Abdominal: Normal Bowel Sounds, Soft, Non-Tender, No Distention, No Mass


Extremities: Normal Inspection, Normal Capillary Refill


Neurological: Alert, Normal Cognition (answers questions appropriately with head

 nods; but has muttered or garbled speech)


Psychiatric: Normal Affect, Normal Mood


Skin Exam: Warm, Dry, Intact, Normal Color, No Rash, Ecchymosis (to lateral eye 

sockets)





EKG INTERPRETATION


EKG Date: 09/11/20


Time: 11:31


Rhythm: NSR


Rate (Beats/Min): 77


Axis: Normal


P-Wave: Present


QRS: Normal


ST-T: Normal


QT: Normal


Comparison: No Change


EKG Interpretation Comments: 





No obvious ischemia or acute ST changes noted, reviewed by myself and Dr. Jurado.





Course





- Vital Signs


Last Recorded V/S: 


                                Last Vital Signs











Temp  97.2 F   09/11/20 11:04


 


Pulse  81   09/11/20 11:04


 


Resp  18   09/11/20 11:04


 


BP  133/66   09/11/20 13:09


 


Pulse Ox  94 L  09/11/20 11:04














- Orders/Labs/Meds


Orders: 


                               Active Orders 24 hr











 Category Date Time Status


 


 Blood Pressure Mgt: Sepsis [RC] Q15MX2 Care  09/11/20 11:15 Active


 


 EKG Documentation Completion [RC] STAT Care  09/11/20 11:26 Active


 


 CULTURE BLOOD [BC] Stat Lab  09/11/20 12:03 Received


 


 CULTURE BLOOD [BC] Stat Lab  09/11/20 12:20 Received


 


 CULTURE URINE [RM] Routine Lab  09/11/20 13:06 Ordered


 


 Sodium Chloride 0.9% [Normal Saline] 1,000 ml Med  09/11/20 13:33 Ordered





 IV ONETIME   


 


 Sodium Chloride 0.9% [Normal Saline] 100 ml Med  09/11/20 13:45 Active





 IV ASDIRECTED   


 


 Sodium Chloride 0.9% [Saline Flush] Med  09/11/20 11:14 Active





 10 ml FLUSH ASDIRECTED PRN   


 


 Blood Culture x2 Reflex Set [OM.PC] Stat Oth  09/11/20 11:14 Ordered


 


 Saline Lock Insert [OM.PC] Stat Oth  09/11/20 11:14 Ordered








                                Medication Orders





Sodium Chloride (Normal Saline)  1,000 mls @ 200 mls/hr IV ONETIME ONE


   Stop: 09/11/20 18:32


   Last Admin: 09/11/20 14:01  Dose: 200 mls/hr


   Documented by: HAILEE


Sodium Chloride (Normal Saline)  100 mls @ 75 mls/hr IV ASDIRECTED LUIS A


   Last Admin: 09/11/20 13:45  Dose: 75 mls/hr


   Documented by: ROMERO


Sodium Chloride (Saline Flush)  10 ml FLUSH ASDIRECTED PRN


   PRN Reason: Keep Vein Open


   Last Admin: 09/11/20 11:21  Dose: 10 ml


   Documented by: HAILEE








Labs: 


                                Laboratory Tests











  09/11/20 09/11/20 09/11/20 Range/Units





  10:56 10:56 10:56 


 


WBC  13.46 H    (4.23-9.07)  K/mm3


 


RBC  5.13    (4.63-6.08)  M/mm3


 


Hgb  15.1    (13.7-17.5)  gm/dl


 


Hct  48.8    (40.1-51.0)  %


 


MCV  95.1 H    (79.0-92.2)  fl


 


MCH  29.4    (25.7-32.2)  pg


 


MCHC  30.9 L    (32.2-35.5)  g/dl


 


RDW Std Deviation  50.5 H    (35.1-43.9)  fL


 


Plt Count  192    (163-337)  K/mm3


 


MPV  12.4 H    (9.4-12.3)  fl


 


Neutrophils % (Manual)  83 H    (40-60)  %


 


Band Neutrophils %  1    (0-10)  %


 


Lymphocytes % (Manual)  11 L    (20-40)  %


 


Atypical Lymphs %  0    %


 


Monocytes % (Manual)  5    (2-10)  %


 


Eosinophils % (Manual)  0 L    (0.8-7.0)  %


 


Basophils % (Manual)  0 L    (0.2-1.2)  


 


Platelet Estimate  Adequate    


 


RBC Morph Comment  Normal    


 


PT   11.6   (9.7-11.7)  SECONDS


 


INR   1.09   


 


D-Dimer, Quantitative     (0.19-0.50)  mg/L


 


Sodium    148 H  (136-145)  mEq/L


 


Potassium    4.4  (3.5-5.1)  mEq/L


 


Chloride    109 H  ()  mEq/L


 


Carbon Dioxide    32  (21-32)  mEq/L


 


Anion Gap    11.4  (5-15)  


 


BUN    48 H  (7-18)  mg/dL


 


Creatinine    1.2  (0.7-1.3)  mg/dL


 


Est Cr Clr Drug Dosing    49.48  mL/min


 


Estimated GFR (MDRD)    59  (>60)  mL/min


 


BUN/Creatinine Ratio    40.0 H  (14-18)  


 


Glucose    156 H  ()  mg/dL


 


Lactic Acid     (0.4-2.0)  mmol/L


 


Calcium    9.1  (8.5-10.1)  mg/dL


 


Magnesium     (1.8-2.4)  mg/dl


 


Ferritin     ()  ng/ml


 


Total Bilirubin    0.5  (0.2-1.0)  mg/dL


 


AST    35  (15-37)  U/L


 


ALT    34  (16-63)  U/L


 


Alkaline Phosphatase    123 H  ()  U/L


 


Lactate Dehydrogenase     ()  U/L


 


Troponin I     (0.00-0.056)  ng/mL


 


C-Reactive Protein    17.5 H*  (<1.0)  mg/dL


 


Total Protein    7.2  (6.4-8.2)  g/dl


 


Albumin    3.1 L  (3.4-5.0)  g/dl


 


Globulin    4.1  gm/dL


 


Albumin/Globulin Ratio    0.8 L  (1-2)  


 


Urine Color     (Yellow)  


 


Urine Appearance     (Clear)  


 


Urine pH     (5.0-8.0)  


 


Ur Specific Gravity     (1.005-1.030)  


 


Urine Protein     (Negative)  


 


Urine Glucose (UA)     (Negative)  


 


Urine Ketones     (Negative)  


 


Urine Occult Blood     (Negative)  


 


Urine Nitrite     (Negative)  


 


Urine Bilirubin     (Negative)  


 


Urine Urobilinogen     (0.2-1.0)  


 


Ur Leukocyte Esterase     (Negative)  


 


Urine RBC     (0-5)  /hpf


 


Urine WBC     (0-5)  /hpf


 


Ur Squamous Epith Cells     (0-5)  /hpf


 


Amorphous Sediment     (NOT SEEN)  /hpf


 


Urine Bacteria     (FEW)  /hpf


 


Urine Mucus     (FEW)  /hpf


 


SARS Virus RNA (PCR)     (NEGATIVE)  














  09/11/20 09/11/20 09/11/20 Range/Units





  10:56 10:56 10:56 


 


WBC     (4.23-9.07)  K/mm3


 


RBC     (4.63-6.08)  M/mm3


 


Hgb     (13.7-17.5)  gm/dl


 


Hct     (40.1-51.0)  %


 


MCV     (79.0-92.2)  fl


 


MCH     (25.7-32.2)  pg


 


MCHC     (32.2-35.5)  g/dl


 


RDW Std Deviation     (35.1-43.9)  fL


 


Plt Count     (163-337)  K/mm3


 


MPV     (9.4-12.3)  fl


 


Neutrophils % (Manual)     (40-60)  %


 


Band Neutrophils %     (0-10)  %


 


Lymphocytes % (Manual)     (20-40)  %


 


Atypical Lymphs %     %


 


Monocytes % (Manual)     (2-10)  %


 


Eosinophils % (Manual)     (0.8-7.0)  %


 


Basophils % (Manual)     (0.2-1.2)  


 


Platelet Estimate     


 


RBC Morph Comment     


 


PT     (9.7-11.7)  SECONDS


 


INR     


 


D-Dimer, Quantitative   3.39 H   (0.19-0.50)  mg/L


 


Sodium     (136-145)  mEq/L


 


Potassium     (3.5-5.1)  mEq/L


 


Chloride     ()  mEq/L


 


Carbon Dioxide     (21-32)  mEq/L


 


Anion Gap     (5-15)  


 


BUN     (7-18)  mg/dL


 


Creatinine     (0.7-1.3)  mg/dL


 


Est Cr Clr Drug Dosing     mL/min


 


Estimated GFR (MDRD)     (>60)  mL/min


 


BUN/Creatinine Ratio     (14-18)  


 


Glucose     ()  mg/dL


 


Lactic Acid     (0.4-2.0)  mmol/L


 


Calcium     (8.5-10.1)  mg/dL


 


Magnesium  2.4    (1.8-2.4)  mg/dl


 


Ferritin    285  ()  ng/ml


 


Total Bilirubin     (0.2-1.0)  mg/dL


 


AST     (15-37)  U/L


 


ALT     (16-63)  U/L


 


Alkaline Phosphatase     ()  U/L


 


Lactate Dehydrogenase  206    ()  U/L


 


Troponin I  < 0.017    (0.00-0.056)  ng/mL


 


C-Reactive Protein     (<1.0)  mg/dL


 


Total Protein     (6.4-8.2)  g/dl


 


Albumin     (3.4-5.0)  g/dl


 


Globulin     gm/dL


 


Albumin/Globulin Ratio     (1-2)  


 


Urine Color     (Yellow)  


 


Urine Appearance     (Clear)  


 


Urine pH     (5.0-8.0)  


 


Ur Specific Gravity     (1.005-1.030)  


 


Urine Protein     (Negative)  


 


Urine Glucose (UA)     (Negative)  


 


Urine Ketones     (Negative)  


 


Urine Occult Blood     (Negative)  


 


Urine Nitrite     (Negative)  


 


Urine Bilirubin     (Negative)  


 


Urine Urobilinogen     (0.2-1.0)  


 


Ur Leukocyte Esterase     (Negative)  


 


Urine RBC     (0-5)  /hpf


 


Urine WBC     (0-5)  /hpf


 


Ur Squamous Epith Cells     (0-5)  /hpf


 


Amorphous Sediment     (NOT SEEN)  /hpf


 


Urine Bacteria     (FEW)  /hpf


 


Urine Mucus     (FEW)  /hpf


 


SARS Virus RNA (PCR)     (NEGATIVE)  














  09/11/20 09/11/20 09/11/20 Range/Units





  12:03 12:20 12:25 


 


WBC     (4.23-9.07)  K/mm3


 


RBC     (4.63-6.08)  M/mm3


 


Hgb     (13.7-17.5)  gm/dl


 


Hct     (40.1-51.0)  %


 


MCV     (79.0-92.2)  fl


 


MCH     (25.7-32.2)  pg


 


MCHC     (32.2-35.5)  g/dl


 


RDW Std Deviation     (35.1-43.9)  fL


 


Plt Count     (163-337)  K/mm3


 


MPV     (9.4-12.3)  fl


 


Neutrophils % (Manual)     (40-60)  %


 


Band Neutrophils %     (0-10)  %


 


Lymphocytes % (Manual)     (20-40)  %


 


Atypical Lymphs %     %


 


Monocytes % (Manual)     (2-10)  %


 


Eosinophils % (Manual)     (0.8-7.0)  %


 


Basophils % (Manual)     (0.2-1.2)  


 


Platelet Estimate     


 


RBC Morph Comment     


 


PT     (9.7-11.7)  SECONDS


 


INR     


 


D-Dimer, Quantitative     (0.19-0.50)  mg/L


 


Sodium     (136-145)  mEq/L


 


Potassium     (3.5-5.1)  mEq/L


 


Chloride     ()  mEq/L


 


Carbon Dioxide     (21-32)  mEq/L


 


Anion Gap     (5-15)  


 


BUN     (7-18)  mg/dL


 


Creatinine     (0.7-1.3)  mg/dL


 


Est Cr Clr Drug Dosing     mL/min


 


Estimated GFR (MDRD)     (>60)  mL/min


 


BUN/Creatinine Ratio     (14-18)  


 


Glucose     ()  mg/dL


 


Lactic Acid  1.5    (0.4-2.0)  mmol/L


 


Calcium     (8.5-10.1)  mg/dL


 


Magnesium     (1.8-2.4)  mg/dl


 


Ferritin     ()  ng/ml


 


Total Bilirubin     (0.2-1.0)  mg/dL


 


AST     (15-37)  U/L


 


ALT     (16-63)  U/L


 


Alkaline Phosphatase     ()  U/L


 


Lactate Dehydrogenase     ()  U/L


 


Troponin I     (0.00-0.056)  ng/mL


 


C-Reactive Protein     (<1.0)  mg/dL


 


Total Protein     (6.4-8.2)  g/dl


 


Albumin     (3.4-5.0)  g/dl


 


Globulin     gm/dL


 


Albumin/Globulin Ratio     (1-2)  


 


Urine Color    Dark yellow  (Yellow)  


 


Urine Appearance    Clear  (Clear)  


 


Urine pH    6.0  (5.0-8.0)  


 


Ur Specific Gravity    > or = 1.030  (1.005-1.030)  


 


Urine Protein    Trace H  (Negative)  


 


Urine Glucose (UA)    Negative  (Negative)  


 


Urine Ketones    Negative  (Negative)  


 


Urine Occult Blood    Trace-intact H  (Negative)  


 


Urine Nitrite    Positive H  (Negative)  


 


Urine Bilirubin    2+ H  (Negative)  


 


Urine Urobilinogen    1.0  (0.2-1.0)  


 


Ur Leukocyte Esterase    Negative  (Negative)  


 


Urine RBC    5-10 H  (0-5)  /hpf


 


Urine WBC    0-5  (0-5)  /hpf


 


Ur Squamous Epith Cells    0-5  (0-5)  /hpf


 


Amorphous Sediment    Few H  (NOT SEEN)  /hpf


 


Urine Bacteria    Many H  (FEW)  /hpf


 


Urine Mucus    Not seen  (FEW)  /hpf


 


SARS Virus RNA (PCR)   Negative   (NEGATIVE)  











Meds: 


Medications











Generic Name Dose Route Start Last Admin





  Trade Name Kacy  PRN Reason Stop Dose Admin


 


Sodium Chloride  1,000 mls @ 200 mls/hr  09/11/20 13:33  09/11/20 14:01





  Normal Saline  IV  09/11/20 18:32  200 mls/hr





  ONETIME ONE   Administration


 


Sodium Chloride  100 mls @ 75 mls/hr  09/11/20 13:45  09/11/20 13:45





  Normal Saline  IV   75 mls/hr





  ASDIRECTED LUIS A   Administration


 


Sodium Chloride  10 ml  09/11/20 11:14  09/11/20 11:21





  Saline Flush  FLUSH   10 ml





  ASDIRECTED PRN   Administration





  Keep Vein Open  














Discontinued Medications














Generic Name Dose Route Start Last Admin





  Trade Name Kacy  PRN Reason Stop Dose Admin


 


Sodium Chloride  1,000 mls @ 999 mls/hr  09/11/20 11:14  09/11/20 11:21





  Normal Saline  IV  09/11/20 12:14  999 mls/hr





  BOLUS ONE   Administration





  Protocol  


 


Ceftriaxone Sodium 2 gm/  100 mls @ 200 mls/hr  09/11/20 13:06  09/11/20 13:27





  Sodium Chloride  IV  09/11/20 13:35  200 mls/hr





  ONETIME ONE   Administration


 


Iopamidol  100 ml  09/11/20 13:44  09/11/20 13:45





  Isovue-370 (76%)  IVPUSH  09/11/20 13:45  100 ml





  ONETIME ONE   Administration














- Re-Assessments/Exams


Free Text/Narrative Re-Assessment/Exam: 





09/11/20 11:33


Patient presents to the ED for the evaluation of his generalized weakness 

decreased mentation.  Labs will be obtained along with EKG, troponin, urinalysis

 for further evaluation. Unsure as to what has happened with him. I evaluated 

him 1 month ago, and he was alert and able to answer questions appropriately.





09/11/20 12:25


Patient's labs have started to return, his white blood cell count is elevated at

 13.46 with 83% neutrophils and 1 band, which is an interval change from labs 

done 3 days ago.  D-dimer is also elevated at 3.39.  CTA of his chest will be 

performed as his creatinine is within normal limits at 1.2 and his GFR is 59.  

Sodium mildly elevated 148, BUN is elevated at 48, troponin is undetectably low,

 CRP is markedly elevated from 3 days ago to 17.5 as well.  Ferritin is within 

normal limits, LDH is within normal limits.  There is still a few labs pending 

at this time head CT showed no acute intracranial abnormalities, chest x-ray was

 also negative for any acute abnormalities.





09/11/20 13:10


The nurse reports that the patient had a bowel movement at the bedside, and it 

was brown and normal looking, no sign of black tarry stools.  Patient's 

urinalysis did come back and is nitrite positive would be suggestive of UTI in 

nature.  Patient will be started on 2 g Rocephin.  CTA is pending until we can 

get the coronavirus test back and resulted.  This should be done in the next few

 minutes.





09/11/20 14:26


CTA has been done, and do demonstrate small filling defects within the distal 

main, segmental, and subsegmental anterior branch of the right lower pulmonary 

artery.  With no other findings of pulmonary embolus seen.  There is bibasilar 

atelectasis worse on the right side.  No evidence of pneumonia or other 

consolidation type process.  Discussed the patient's course with Dr. Riley, 

she will be over to evaluate the patient for possible hospital admission at this

 time.





Departure





- Departure


Time of Disposition: 14:30


Disposition: Admitted As Inpatient 67


Condition: Fair


Clinical Impression: 


 Mental status, decreased, Generalized weakness, Pulmonary embolus, right





UTI (urinary tract infection)


Qualifiers:


 Urinary tract infection type: acute cystitis Hematuria presence: with hematuria

 Qualified Code(s): N30.01 - Acute cystitis with hematuria








- Discharge Information


*PRESCRIPTION DRUG MONITORING PROGRAM REVIEWED*: No


*COPY OF PRESCRIPTION DRUG MONITORING REPORT IN PATIENT MOHAN: No


Referrals: 


Wallace Ojeda MD [Primary Care Provider] - 


Forms:  ED Department Discharge





Sepsis Event Note (ED)





- Evaluation


Sepsis Screening Result: No Definite Risk





- Focused Exam


Vital Signs: 


                                   Vital Signs











  Temp Pulse Resp BP Pulse Ox


 


 09/11/20 13:09     133/66 


 


 09/11/20 12:40     122/63 


 


 09/11/20 11:04  97.2 F  81  18  96/69  94 L














- My Orders


Last 24 Hours: 


My Active Orders





09/11/20 11:14


Sodium Chloride 0.9% [Saline Flush]   10 ml FLUSH ASDIRECTED PRN 


Blood Culture x2 Reflex Set [OM.PC] Stat 


Saline Lock Insert [OM.PC] Stat 





09/11/20 11:15


Blood Pressure Mgt: Sepsis [RC] Q15MX2 





09/11/20 11:26


EKG Documentation Completion [RC] STAT 





09/11/20 12:03


CULTURE BLOOD [BC] Stat 





09/11/20 12:20


CULTURE BLOOD [BC] Stat 





09/11/20 13:06


CULTURE URINE [RM] Routine 





09/11/20 13:33


Sodium Chloride 0.9% [Normal Saline] 1,000 ml IV ONETIME 





09/11/20 13:45


Sodium Chloride 0.9% [Normal Saline] 100 ml IV ASDIRECTED 














- Assessment/Plan


Last 24 Hours: 


My Active Orders





09/11/20 11:14


Sodium Chloride 0.9% [Saline Flush]   10 ml FLUSH ASDIRECTED PRN 


Blood Culture x2 Reflex Set [OM.PC] Stat 


Saline Lock Insert [OM.PC] Stat 





09/11/20 11:15


Blood Pressure Mgt: Sepsis [RC] Q15MX2 





09/11/20 11:26


EKG Documentation Completion [RC] STAT 





09/11/20 12:03


CULTURE BLOOD [BC] Stat 





09/11/20 12:20


CULTURE BLOOD [BC] Stat 





09/11/20 13:06


CULTURE URINE [RM] Routine 





09/11/20 13:33


Sodium Chloride 0.9% [Normal Saline] 1,000 ml IV ONETIME 





09/11/20 13:45


Sodium Chloride 0.9% [Normal Saline] 100 ml IV ASDIRECTED

## 2020-09-11 NOTE — PCM.HP.2
H&P History of Present Illness





- General


Date of Service: 09/11/20


Admit Problem/Dx: 


                           Admission Diagnosis/Problem





Admission Diagnosis/Problem      UTI, Urinary tract infectious disease








Source of Information: Old Records, Provider, RN, RN Notes Reviewed


History Limitations: Reports: Altered Mental Status





- History of Present Illness


Initial Comments - Free Text/Narative: 


Patient is a 79-year-old male who presents via [place] ambulance due to 

generalized weakness and decreased mentation.  He was seen 3 days prior for 

similar symptoms and nothing was found during that work-up.  Per the ED provider

staff report that the patient was admitted there on August 27 and he has been sl

owly deteriorating.  He was able to ambulate on admission but today he is not 

even able to hold his head up.  They note he had a fever of 102 F yesterday and

there were concerns of a possible watery coffee-ground stools, although this was

after a suppository.  ED provider notes that the patient was alert enough to 

make eye contact and would shake his head yes or no.  They report he will 

attempt to answer questions but is quite garbled.  There is no focal 

neurological abnormalities noted.





In the ED temp is 97 2.  Pulse 81.  Respirations 18.  Blood pressure 96/69.  

Oxygen saturation 94% on room air.  Twelve-lead EKG is obtained showing a sinus 

rhythm at 77 bpm with no obvious ischemia or acute changes noted.  Labs are 

obtained showing leukocytosis at 13.46 with 83% neutrophils.  Sodium is high at 

148.  Potassium 4.4.  Creatinine is 1.2 with GFR 59.  CRP 17.5.  Albumin is low 

at 3.1.  D-dimer is obtained and is 3.39.  Troponin is negative at less than 

0.017.  LDH is 206.  Ferritin is 285.  Magnesium 2.4.  Lactic acid is 1.5.  UA 

is obtained and shows concentrated urine with trace protein, trace intact blood,

positive nitrite, 2+ bilirubin, 5-10 RBCs, 0-5 WBCs, and many bacteria noted.  

SARS COVID screen is negative.  Nursing notes that the patient did have a bowel 

movement and it was brown and normal-looking with no signs of melena or 

hematochezia.  He started on 2 g of Rocephin for his apparent UTI.  Due to 

elevated d-dimer CTA is obtained showing several small pulmonary emboli on the 

right side.  Bibasilar atelectasis worse on the right is also noted.  Head CT is

obtained showing senescent change catheters are noted on both sides which are 

extra-axial in location and stable and nothing acute is appreciated.  Chest x-

ray shows nothing acute.





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  He resides at 

[facility] in [place]. His PCP is Dr. Ojeda.








- Related Data


Allergies/Adverse Reactions: 


                                    Allergies











Allergy/AdvReac Type Severity Reaction Status Date / Time


 


No Known Allergies Allergy   Verified 09/11/20 11:03











Home Medications: 


                                    Home Meds





Phenytoin Sodium Extended [Dilantin] 100 mg PO QAM 08/11/20 [History]


Primidone [Mysoline] 250 mg PO BID 08/11/20 [History]


lisinopriL [Lisinopril] 20 mg PO DAILY 08/11/20 [History]


Phenytoin Sodium Extended [Dilantin] 150 mg PO QPM 08/15/20 [History]











Past Medical History


HEENT History: Reports: Other (See Below)


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


Cardiovascular History: Reports: Hypertension


Neurological History: Reports: Seizure


Other Neuro History: intercranial hypertension





- Past Surgical History


Head Surgeries/Procedures: Reports: Shunt


HEENT Surgical History: Reports: Detached Retina





Social & Family History





- Tobacco Use


Smoking Status *Q: Unknown Ever Smoked


Second Hand Smoke Exposure: No





- Caffeine Use


Caffeine Use: Reports: None





- Recreational Drug Use


Recreational Drug Use: No





- Living Situation & Occupation


Living situation: Reports: Extended Care Facility ([facility])





H&P Review of Systems





- Review of Systems:


Review Of Systems: See Below


General: Reports: Weakness.  Denies: Fever


Neurological: Reports: Confusion


Review of Systems Comment:: 


Difficult to obtain ROS due to patient's baseline mental status and difficulty 

communicating.  Patient denies any current pain but states that he is hungry and

 has yet to eat.








Exam





- Exam


Exam: See Below





- Vital Signs


Vital Signs: 


                                Last Vital Signs











Temp  97.2 F   09/11/20 11:04


 


Pulse  81   09/11/20 11:04


 


Resp  18   09/11/20 11:04


 


BP  133/66   09/11/20 13:09


 


Pulse Ox  94 L  09/11/20 11:04











Weight: 152 lb





- Exam


Quality Assessment: DVT Prophylaxis.  No: Supplemental Oxygen, Urinary Catheter


General: Alert, Oriented, Cooperative


HEENT: Conjunctiva Clear, EACs Clear, Mucosa Moist & Pink, Posterior Pharynx 

Clear, Abnormal Pupils (Left )


Neck: Supple, Trachea Midline


Lungs: Clear to Auscultation, Normal Respiratory Effort


Cardiovascular: Regular Rate, Regular Rhythm


GI/Abdominal Exam: Normal Bowel Sounds, Soft, Non-Tender, No Distention


 (Male) Exam: Deferred


Rectal (Males) Exam: Deferred


Extremities: Normal Inspection, Normal Range of Motion, Non-Tender, No Pedal 

Edema


Peripheral Pulses: 3+: Radial (L), Radial (R), Dorsalis Pedis (L), Dorsalis 

Pedis (R)


Skin: Warm, Dry, Intact


Neuro Extensive - Mental Status: Alert





- Patient Data


Lab Results Last 24 hrs: 


                         Laboratory Results - last 24 hr











  09/11/20 09/11/20 09/11/20 Range/Units





  10:56 10:56 10:56 


 


WBC  13.46 H    (4.23-9.07)  K/mm3


 


RBC  5.13    (4.63-6.08)  M/mm3


 


Hgb  15.1    (13.7-17.5)  gm/dl


 


Hct  48.8    (40.1-51.0)  %


 


MCV  95.1 H    (79.0-92.2)  fl


 


MCH  29.4    (25.7-32.2)  pg


 


MCHC  30.9 L    (32.2-35.5)  g/dl


 


RDW Std Deviation  50.5 H    (35.1-43.9)  fL


 


Plt Count  192    (163-337)  K/mm3


 


MPV  12.4 H    (9.4-12.3)  fl


 


Neutrophils % (Manual)  83 H    (40-60)  %


 


Band Neutrophils %  1    (0-10)  %


 


Lymphocytes % (Manual)  11 L    (20-40)  %


 


Atypical Lymphs %  0    %


 


Monocytes % (Manual)  5    (2-10)  %


 


Eosinophils % (Manual)  0 L    (0.8-7.0)  %


 


Basophils % (Manual)  0 L    (0.2-1.2)  


 


Platelet Estimate  Adequate    


 


RBC Morph Comment  Normal    


 


PT   11.6   (9.7-11.7)  SECONDS


 


INR   1.09   


 


D-Dimer, Quantitative     (0.19-0.50)  mg/L


 


Sodium    148 H  (136-145)  mEq/L


 


Potassium    4.4  (3.5-5.1)  mEq/L


 


Chloride    109 H  ()  mEq/L


 


Carbon Dioxide    32  (21-32)  mEq/L


 


Anion Gap    11.4  (5-15)  


 


BUN    48 H  (7-18)  mg/dL


 


Creatinine    1.2  (0.7-1.3)  mg/dL


 


Est Cr Clr Drug Dosing    49.48  mL/min


 


Estimated GFR (MDRD)    59  (>60)  mL/min


 


BUN/Creatinine Ratio    40.0 H  (14-18)  


 


Glucose    156 H  ()  mg/dL


 


Lactic Acid     (0.4-2.0)  mmol/L


 


Calcium    9.1  (8.5-10.1)  mg/dL


 


Magnesium     (1.8-2.4)  mg/dl


 


Ferritin     ()  ng/ml


 


Total Bilirubin    0.5  (0.2-1.0)  mg/dL


 


AST    35  (15-37)  U/L


 


ALT    34  (16-63)  U/L


 


Alkaline Phosphatase    123 H  ()  U/L


 


Lactate Dehydrogenase     ()  U/L


 


Troponin I     (0.00-0.056)  ng/mL


 


C-Reactive Protein    17.5 H*  (<1.0)  mg/dL


 


Total Protein    7.2  (6.4-8.2)  g/dl


 


Albumin    3.1 L  (3.4-5.0)  g/dl


 


Globulin    4.1  gm/dL


 


Albumin/Globulin Ratio    0.8 L  (1-2)  


 


Urine Color     (Yellow)  


 


Urine Appearance     (Clear)  


 


Urine pH     (5.0-8.0)  


 


Ur Specific Gravity     (1.005-1.030)  


 


Urine Protein     (Negative)  


 


Urine Glucose (UA)     (Negative)  


 


Urine Ketones     (Negative)  


 


Urine Occult Blood     (Negative)  


 


Urine Nitrite     (Negative)  


 


Urine Bilirubin     (Negative)  


 


Urine Urobilinogen     (0.2-1.0)  


 


Ur Leukocyte Esterase     (Negative)  


 


Urine RBC     (0-5)  /hpf


 


Urine WBC     (0-5)  /hpf


 


Ur Squamous Epith Cells     (0-5)  /hpf


 


Amorphous Sediment     (NOT SEEN)  /hpf


 


Urine Bacteria     (FEW)  /hpf


 


Urine Mucus     (FEW)  /hpf


 


SARS Virus RNA (PCR)     (NEGATIVE)  














  09/11/20 09/11/20 09/11/20 Range/Units





  10:56 10:56 10:56 


 


WBC     (4.23-9.07)  K/mm3


 


RBC     (4.63-6.08)  M/mm3


 


Hgb     (13.7-17.5)  gm/dl


 


Hct     (40.1-51.0)  %


 


MCV     (79.0-92.2)  fl


 


MCH     (25.7-32.2)  pg


 


MCHC     (32.2-35.5)  g/dl


 


RDW Std Deviation     (35.1-43.9)  fL


 


Plt Count     (163-337)  K/mm3


 


MPV     (9.4-12.3)  fl


 


Neutrophils % (Manual)     (40-60)  %


 


Band Neutrophils %     (0-10)  %


 


Lymphocytes % (Manual)     (20-40)  %


 


Atypical Lymphs %     %


 


Monocytes % (Manual)     (2-10)  %


 


Eosinophils % (Manual)     (0.8-7.0)  %


 


Basophils % (Manual)     (0.2-1.2)  


 


Platelet Estimate     


 


RBC Morph Comment     


 


PT     (9.7-11.7)  SECONDS


 


INR     


 


D-Dimer, Quantitative   3.39 H   (0.19-0.50)  mg/L


 


Sodium     (136-145)  mEq/L


 


Potassium     (3.5-5.1)  mEq/L


 


Chloride     ()  mEq/L


 


Carbon Dioxide     (21-32)  mEq/L


 


Anion Gap     (5-15)  


 


BUN     (7-18)  mg/dL


 


Creatinine     (0.7-1.3)  mg/dL


 


Est Cr Clr Drug Dosing     mL/min


 


Estimated GFR (MDRD)     (>60)  mL/min


 


BUN/Creatinine Ratio     (14-18)  


 


Glucose     ()  mg/dL


 


Lactic Acid     (0.4-2.0)  mmol/L


 


Calcium     (8.5-10.1)  mg/dL


 


Magnesium  2.4    (1.8-2.4)  mg/dl


 


Ferritin    285  ()  ng/ml


 


Total Bilirubin     (0.2-1.0)  mg/dL


 


AST     (15-37)  U/L


 


ALT     (16-63)  U/L


 


Alkaline Phosphatase     ()  U/L


 


Lactate Dehydrogenase  206    ()  U/L


 


Troponin I  < 0.017    (0.00-0.056)  ng/mL


 


C-Reactive Protein     (<1.0)  mg/dL


 


Total Protein     (6.4-8.2)  g/dl


 


Albumin     (3.4-5.0)  g/dl


 


Globulin     gm/dL


 


Albumin/Globulin Ratio     (1-2)  


 


Urine Color     (Yellow)  


 


Urine Appearance     (Clear)  


 


Urine pH     (5.0-8.0)  


 


Ur Specific Gravity     (1.005-1.030)  


 


Urine Protein     (Negative)  


 


Urine Glucose (UA)     (Negative)  


 


Urine Ketones     (Negative)  


 


Urine Occult Blood     (Negative)  


 


Urine Nitrite     (Negative)  


 


Urine Bilirubin     (Negative)  


 


Urine Urobilinogen     (0.2-1.0)  


 


Ur Leukocyte Esterase     (Negative)  


 


Urine RBC     (0-5)  /hpf


 


Urine WBC     (0-5)  /hpf


 


Ur Squamous Epith Cells     (0-5)  /hpf


 


Amorphous Sediment     (NOT SEEN)  /hpf


 


Urine Bacteria     (FEW)  /hpf


 


Urine Mucus     (FEW)  /hpf


 


SARS Virus RNA (PCR)     (NEGATIVE)  














  09/11/20 09/11/20 09/11/20 Range/Units





  12:03 12:20 12:25 


 


WBC     (4.23-9.07)  K/mm3


 


RBC     (4.63-6.08)  M/mm3


 


Hgb     (13.7-17.5)  gm/dl


 


Hct     (40.1-51.0)  %


 


MCV     (79.0-92.2)  fl


 


MCH     (25.7-32.2)  pg


 


MCHC     (32.2-35.5)  g/dl


 


RDW Std Deviation     (35.1-43.9)  fL


 


Plt Count     (163-337)  K/mm3


 


MPV     (9.4-12.3)  fl


 


Neutrophils % (Manual)     (40-60)  %


 


Band Neutrophils %     (0-10)  %


 


Lymphocytes % (Manual)     (20-40)  %


 


Atypical Lymphs %     %


 


Monocytes % (Manual)     (2-10)  %


 


Eosinophils % (Manual)     (0.8-7.0)  %


 


Basophils % (Manual)     (0.2-1.2)  


 


Platelet Estimate     


 


RBC Morph Comment     


 


PT     (9.7-11.7)  SECONDS


 


INR     


 


D-Dimer, Quantitative     (0.19-0.50)  mg/L


 


Sodium     (136-145)  mEq/L


 


Potassium     (3.5-5.1)  mEq/L


 


Chloride     ()  mEq/L


 


Carbon Dioxide     (21-32)  mEq/L


 


Anion Gap     (5-15)  


 


BUN     (7-18)  mg/dL


 


Creatinine     (0.7-1.3)  mg/dL


 


Est Cr Clr Drug Dosing     mL/min


 


Estimated GFR (MDRD)     (>60)  mL/min


 


BUN/Creatinine Ratio     (14-18)  


 


Glucose     ()  mg/dL


 


Lactic Acid  1.5    (0.4-2.0)  mmol/L


 


Calcium     (8.5-10.1)  mg/dL


 


Magnesium     (1.8-2.4)  mg/dl


 


Ferritin     ()  ng/ml


 


Total Bilirubin     (0.2-1.0)  mg/dL


 


AST     (15-37)  U/L


 


ALT     (16-63)  U/L


 


Alkaline Phosphatase     ()  U/L


 


Lactate Dehydrogenase     ()  U/L


 


Troponin I     (0.00-0.056)  ng/mL


 


C-Reactive Protein     (<1.0)  mg/dL


 


Total Protein     (6.4-8.2)  g/dl


 


Albumin     (3.4-5.0)  g/dl


 


Globulin     gm/dL


 


Albumin/Globulin Ratio     (1-2)  


 


Urine Color    Dark yellow  (Yellow)  


 


Urine Appearance    Clear  (Clear)  


 


Urine pH    6.0  (5.0-8.0)  


 


Ur Specific Gravity    > or = 1.030  (1.005-1.030)  


 


Urine Protein    Trace H  (Negative)  


 


Urine Glucose (UA)    Negative  (Negative)  


 


Urine Ketones    Negative  (Negative)  


 


Urine Occult Blood    Trace-intact H  (Negative)  


 


Urine Nitrite    Positive H  (Negative)  


 


Urine Bilirubin    2+ H  (Negative)  


 


Urine Urobilinogen    1.0  (0.2-1.0)  


 


Ur Leukocyte Esterase    Negative  (Negative)  


 


Urine RBC    5-10 H  (0-5)  /hpf


 


Urine WBC    0-5  (0-5)  /hpf


 


Ur Squamous Epith Cells    0-5  (0-5)  /hpf


 


Amorphous Sediment    Few H  (NOT SEEN)  /hpf


 


Urine Bacteria    Many H  (FEW)  /hpf


 


Urine Mucus    Not seen  (FEW)  /hpf


 


SARS Virus RNA (PCR)   Negative   (NEGATIVE)  











Result Diagrams: 


                                 09/11/20 10:56





                                 09/11/20 10:56





Sepsis Event Note





- Evaluation


Sepsis Screening Result: No Definite Risk





- Focused Exam


Vital Signs: 


                                   Vital Signs











  Temp Pulse Resp BP Pulse Ox


 


 09/11/20 13:09     133/66 


 


 09/11/20 12:40     122/63 


 


 09/11/20 11:04  97.2 F  81  18  96/69  94 L














- Problem List


(1) Generalized weakness


SNOMED Code(s): 29661133


   ICD Code: R53.1 - WEAKNESS   Status: Acute   Priority: High   Current Visit: 

Yes   





(2) Mental status, decreased


SNOMED Code(s): 886585366


   ICD Code: R41.82 - ALTERED MENTAL STATUS, UNSPECIFIED   Status: Acute   

Priority: High   Current Visit: Yes   





(3) Pulmonary embolus, right


SNOMED Code(s): 64502756


   ICD Code: I26.99 - OTHER PULMONARY EMBOLISM WITHOUT ACUTE COR PULMONALE   

Status: Acute   Priority: High   Current Visit: Yes   





(4) UTI (urinary tract infection)


SNOMED Code(s): 83431814


   ICD Code: N39.0 - URINARY TRACT INFECTION, SITE NOT SPECIFIED   Status: Acute

   Priority: High   Current Visit: Yes   


Qualifiers: 


   Urinary tract infection type: acute cystitis   Hematuria presence: with 

hematuria   Qualified Code(s): N30.01 - Acute cystitis with hematuria   





(5) Blindness of left eye


SNOMED Code(s): 640081870


   ICD Code: H54.40 - BLINDNESS, ONE EYE, UNSPECIFIED EYE   Status: Chronic   

Priority: Low   Current Visit: No   


Qualifiers: 


   Right eye visual impairment category: right - category 2   Qualified Code(s):

 H54.3 - Unqualified visual loss, both eyes   





(6) Osteoarthritis of hips, bilateral


SNOMED Code(s): 412096090358313


   ICD Code: M16.0 - BILATERAL PRIMARY OSTEOARTHRITIS OF HIP   Status: Chronic  

 Priority: Low   Current Visit: No   


Qualifiers: 


   Osteoarthritis type: unspecified   Qualified Code(s): M16.0 - Bilateral 

primary osteoarthritis of hip   





(7) Osteoarthritis of knees, bilateral


SNOMED Code(s): 688169521591492


   ICD Code: M17.0 - BILATERAL PRIMARY OSTEOARTHRITIS OF KNEE   Status: Chronic 

  Priority: Low   Current Visit: No   


Qualifiers: 


   Osteoarthritis type: unspecified   Qualified Code(s): M17.0 - Bilateral 

primary osteoarthritis of knee   





(8) Severe muscle deconditioning


SNOMED Code(s): 993800796


   ICD Code: R29.898 - OTH SYMPTOMS AND SIGNS INVOLVING THE MUSCULOSKELETAL 

SYSTEM   Status: Chronic   Priority: Medium   Current Visit: Yes   





(9) Hypertension


SNOMED Code(s): 12286926


   ICD Code: I10 - ESSENTIAL (PRIMARY) HYPERTENSION   Status: Chronic   

Priority: Medium   Current Visit: No   


Qualifiers: 


   Hypertension type: unspecified   Qualified Code(s): I10 - Essential (primary)

 hypertension   





(10) History of seizures


SNOMED Code(s): 967525349


   ICD Code: Z87.898 - PERSONAL HISTORY OF OTHER SPECIFIED CONDITIONS   Status: 

Chronic   Priority: Medium   Current Visit: No   





(11) Elevated d-dimer


SNOMED Code(s): 671337833


   ICD Code: R79.89 - OTHER SPECIFIED ABNORMAL FINDINGS OF BLOOD CHEMISTRY   

Status: Acute   Priority: High   Current Visit: Yes   


Problem List Initiated/Reviewed/Updated: Yes


Orders Last 24hrs: 


                               Active Orders 24 hr











 Category Date Time Status


 


 Admission Status [Patient Status] [ADT] Routine ADT  09/11/20 14:41 Active


 


 Bedrest Bedside Commode [RC] ASDIRECTED Care  09/11/20 14:48 Ordered


 


 Blood Pressure Mgt: Sepsis [RC] Q15MX2 Care  09/11/20 11:15 Active


 


 EKG Documentation Completion [RC] STAT Care  09/11/20 11:26 Active


 


 Height and Weight [RC] DAILY Care  09/11/20 14:48 Ordered


 


 Intake and Output [RC] QSHIFT Care  09/11/20 14:49 Ordered


 


 Oxygen Therapy [RC] PRN Care  09/11/20 14:48 Ordered


 


 Pulse Oximetry [RC] PRN Care  09/11/20 14:49 Ordered


 


 VTE/DVT Education [RC] PER UNIT ROUTINE Care  09/11/20 14:48 Ordered


 


 Vital Signs [RC] Q4H Care  09/11/20 14:48 Ordered


 


 Consult to Case Management/ [CONS] Cons  09/11/20 14:48 Ordered





 Routine   


 


 Consult to Spiritual Care [CONS] Routine Cons  09/11/20 14:48 Ordered


 


 OT Evaluation and Treatment [CONS] Routine Cons  09/11/20 14:48 Ordered


 


 PT Evaluation and Treatment [CONS] Routine Cons  09/11/20 14:48 Ordered


 


 Nothing per Oral Now Diet [DIET] Diet  09/11/20 Dinner Ordered


 


 CBC WITH AUTO DIFF [HEME] AM Lab  09/12/20 05:11 Ordered


 


 CMP [COMPREHENSIVE METABOLIC PN,CMP] [CHEM] AM Lab  09/12/20 05:11 Ordered


 


 CULTURE BLOOD [BC] Stat Lab  09/11/20 12:03 Received


 


 CULTURE BLOOD [BC] Stat Lab  09/11/20 12:20 Received


 


 CULTURE URINE [RM] Routine Lab  09/11/20 12:25 Received


 


 MAGNESIUM [CHEM] AM Lab  09/12/20 05:11 Ordered


 


 PHOSPHORUS [CHEM] AM Lab  09/12/20 05:11 Ordered


 


 PROCALCITONIN [REF] Q48H Lab  09/13/20 14:32 Ordered


 


 PROCALCITONIN [REF] Q48H Lab  09/15/20 14:32 Ordered


 


 PROCALCITONIN [REF] Stat Lab  09/11/20 10:56 Received


 


 Acetaminophen [TylenoL] Med  09/11/20 14:48 Ordered





 650 mg PO Q4H PRN   


 


 Enoxaparin [Lovenox] Med  09/11/20 21:00 Ordered





 70 mg SUBCUT Q12HR   


 


 Lactated Ringers @  75 MLS/HR(1000ml Bag) Med  09/11/20 15:00 Ordered





 Lactated Ringers [Ringers, Lactated] 1,000 ml   





 IV ASDIRECTED   


 


 Ondansetron [Zofran] Med  09/11/20 14:48 Ordered





 4 mg IV Q6H PRN   


 


 Sodium Chloride 0.9% [Normal Saline] 1,000 ml Med  09/11/20 13:33 Active





 IV ONETIME   


 


 Sodium Chloride 0.9% [Saline Flush] Med  09/11/20 11:14 Active





 10 ml FLUSH ASDIRECTED PRN   


 


 cefTRIAXone [Rocephin] 1 gm Med  09/11/20 15:00 Ordered





 Sodium Chloride 0.9% [Normal Saline] 100 ml   





 IV Q24H   


 


 Blood Culture x2 Reflex Set [OM.PC] Stat Oth  09/11/20 11:14 Ordered


 


 Saline Lock Insert [OM.PC] Stat Oth  09/11/20 11:14 Ordered








                                Medication Orders





Acetaminophen (Tylenol)  650 mg PO Q4H PRN


   PRN Reason: Pain (Mild 1-3)/fever


Enoxaparin Sodium (Lovenox)  70 mg SUBCUT Q12HR LUIS A


Sodium Chloride (Normal Saline)  1,000 mls @ 200 mls/hr IV ONETIME ONE


   Stop: 09/11/20 18:32


   Last Admin: 09/11/20 14:01  Dose: 200 mls/hr


   Documented by: LUIS AOKAT


Ceftriaxone Sodium 1 gm/ (Sodium Chloride)  100 mls @ 200 mls/hr IV Q24H LUIS A


Lactated Ringer's (Ringers, Lactated)  1,000 mls @ 75 mls/hr IV ASDIRECTED LUIS A


Ondansetron HCl (Zofran)  4 mg IV Q6H PRN


   PRN Reason: Nausea/Vomiting


Sodium Chloride (Saline Flush)  10 ml FLUSH ASDIRECTED PRN


   PRN Reason: Keep Vein Open


   Last Admin: 09/11/20 11:21  Dose: 10 ml


   Documented by: HAILEE








Assessment/Plan Comment:: 


Day of admission assessment - 9/11/20


* 80 yo male presents to ED via [place] ambulance for worsening confusion, 

  fever, and generalized weakness


* Also note a history of coffee ground stools at [facility]


* In ED patient will nod yes or no but mumbles incomprehensive answers to 

  questions


* UA shows UTI


* Sepsis criteria: UIT, Afebrile in ED, Leukocytosis, Not tachypneic or 

  tachycardic 


   * Does not meet sepsis criteria


* Labs:


   * WBC 13.46


   * Hgb 15.1


   * Neutrophils 83%


   * Sodium 148


   * Potassium 4.4


   * Creatinine 1.2


   * GFR 59


   * CRP 17.5


   * D-Dimer 3.39


   * Lactic acid 1.5


* Urine cultures and blood cultures ordered 


* Started on rocephin in ED - continue


* IV 1L bolus given in ED





UTI (urinary tract infection)


Generalized weakness


Mental status, decreased


* IV fluids as ordered


* PRN antiemetics


* Tylenol for fevers


* Continue IV Rocephin


* Urine cultures and blood cultures pending


* PT/OT


* CM/SW for discharge planning


* NPO for now until nursing swallow screen


* Bedrest for now


* Phenytoin level pending 





Pulmonary embolus, right


Elevated D-Dimer 


* Lovenox 1mg/kg





Blindness of left eye


* No acute concerns





Osteoarthritis of hips, bilateral


Osteoarthritis of knees, bilateral


Severe muscle deconditioning


* PT/OT


* Dietician consult





Hypertension


* Hold lisinopril for now





History of seizures


* Continue home phenytoin and primidone


* Check phenytoin level





PCP: Dr. Ojeda





Code status: DNR (verified with SNF paperwork)





Prophylaxis:


* DVT: Lovenox for PE as above


* GI: Not indicated





Social: Patient resides at [facility] in [place]





Disposition: Admit inpatient to M/S/P floor for management of UTI, AMS, and 

right sided PE. Anticipated LOS of 3-4 days total. 








- Mortality Measure


Prognosis:: Poor

## 2020-09-11 NOTE — CR
Chest: Full view of the chest was obtained.

 

Comparison: Prior chest x-ray of 09/08/20.

 

Heart size is normal.  Tortuous thoracic aorta is seen.  Lungs are 

clear with no acute parenchymal change.  Bony structures are 

osteopenic.

 

Impression:

1.  Nothing acute is appreciated on portable chest x-ray.

 

Diagnostic code #1

 

This report was dictated in MDT

## 2020-09-12 RX ADMIN — PHENYTOIN SODIUM SCH MG: 100 CAPSULE ORAL at 17:35

## 2020-09-12 RX ADMIN — PHENYTOIN SODIUM SCH MG: 100 CAPSULE ORAL at 08:40

## 2020-09-12 RX ADMIN — EXTENDED PHENYTOIN SODIUM SCH MG: 30 CAPSULE ORAL at 17:35

## 2020-09-12 NOTE — PCM.PN
- General Info


Date of Service: 09/12/20


Subjective Update: 





Slept okay


Is tolerating diet


No reports from nursing





- Patient Data


Vitals - Most Recent: 


                                Last Vital Signs











Temp  99.0 F   09/12/20 08:09


 


Pulse  76   09/12/20 09:01


 


Resp  18   09/12/20 08:09


 


BP  97/57 L  09/12/20 08:11


 


Pulse Ox  92 L  09/12/20 09:01











Weight - Most Recent: 65.68 kg





- Exam


General: Alert, Other (Mumbling and incomprehensible)


HEENT: Mucous Membr. Moist/Pink


Neck: Supple, Trachea Midline, No JVD, No Thyromegaly


Lungs: Decreased Breath Sounds, Crackles.  No: Rales, Rhonchi, Rub, Stridor, 

Wheezing


Cardiovascular: Regular Rate, Regular Rhythm.  No: Murmurs, Gallops, Rubs


GI/Abdominal Exam: Normal Bowel Sounds, Soft, Non-Tender.  No: Distended, 

Guarding, Rigid, Rebound


Extremities: Normal Inspection, No Pedal Edema, Slow Capillary Refill


Peripheral Pulses: 2+: Radial (L), Radial (R)


Skin: Dry, Cool





Sepsis Event Note





- Evaluation


Sepsis Screening Result: No Definite Risk





- Problem List & Annotations


(1) UTI (urinary tract infection)


SNOMED Code(s): 66533791


   Code(s): N39.0 - URINARY TRACT INFECTION, SITE NOT SPECIFIED   Status: Acute 

 Priority: High   Current Visit: No   


Qualifiers: 


   Urinary tract infection type: acute cystitis   Hematuria presence: with 

hematuria   Qualified Code(s): N30.01 - Acute cystitis with hematuria   





(2) Hypoalbuminemia


SNOMED Code(s): 255288453


   Code(s): E88.09 - OTH DISORDERS OF PLASMA-PROTEIN METABOLISM, NEC   Status: 

Acute   Current Visit: Yes   





(3) Blindness of left eye


SNOMED Code(s): 307714200


   Code(s): H54.40 - BLINDNESS, ONE EYE, UNSPECIFIED EYE   Status: Chronic   

Priority: Low   Current Visit: No   


Qualifiers: 


   Right eye visual impairment category: right - category 2   Qualified Code(s):

H54.3 - Unqualified visual loss, both eyes   





(4) Fall


SNOMED Code(s): 6121137, 318190578


   Code(s): W19.XXXA - UNSPECIFIED FALL, INITIAL ENCOUNTER   Status: Acute   C

urrent Visit: No   


Qualifiers: 


   Encounter type: initial encounter   Qualified Code(s): W19.XXXA - Unspecified

fall, initial encounter   





(5) Generalized muscle weakness


SNOMED Code(s): 30684121, 81990339


   Code(s): M62.81 - MUSCLE WEAKNESS (GENERALIZED)   Status: Acute   Current 

Visit: No   





(6) History of seizures


SNOMED Code(s): 308130501


   Code(s): Z87.898 - PERSONAL HISTORY OF OTHER SPECIFIED CONDITIONS   Status: 

Chronic   Priority: Medium   Current Visit: No   





(7) Hypertension


SNOMED Code(s): 23210998


   Code(s): I10 - ESSENTIAL (PRIMARY) HYPERTENSION   Status: Chronic   Priority:

Medium   Current Visit: No   


Qualifiers: 


   Hypertension type: unspecified   Qualified Code(s): I10 - Essential (primary)

hypertension   





(8) Physical deconditioning


SNOMED Code(s): 11354603351264


   Code(s): R53.81 - OTHER MALAISE   Status: Acute   Current Visit: No   





(9) Pulmonary embolus, right


SNOMED Code(s): 42059958


   Code(s): I26.99 - OTHER PULMONARY EMBOLISM WITHOUT ACUTE COR PULMONALE   

Status: Acute   Priority: High   Current Visit: No   





(10) Recurrent falls while walking


SNOMED Code(s): 159106266, 048795544


   Code(s): R29.6 - REPEATED FALLS   Status: Acute   Current Visit: No   





(11) Severe muscle deconditioning


SNOMED Code(s): 896186569


   Code(s): R29.898 - OTH SYMPTOMS AND SIGNS INVOLVING THE MUSCULOSKELETAL 

SYSTEM   Status: Chronic   Priority: Medium   Current Visit: No   





(12) Epilepsy


SNOMED Code(s): 16782303


   Code(s): G40.909 - EPILEPSY, UNSP, NOT INTRACTABLE, WITHOUT STATUS 

EPILEPTICUS   Status: Acute   Current Visit: Yes   





(13) Altered mental status


SNOMED Code(s): 963501204


   Code(s): R41.82 - ALTERED MENTAL STATUS, UNSPECIFIED   Status: Acute   

Current Visit: Yes   





(14) Thrombocytopenia


SNOMED Code(s): 051481062


   Code(s): D69.6 - THROMBOCYTOPENIA, UNSPECIFIED   Status: Acute   Current 

Visit: Yes   





(15) Lymphopenia


Status: Acute   Current Visit: Yes   





(16) Hypernatremia


SNOMED Code(s): 655572206


   Code(s): E87.0 - HYPEROSMOLALITY AND HYPERNATREMIA   Status: Acute   Current 

Visit: Yes   





(17) Hyperchloremia


SNOMED Code(s): 69362172


   Code(s): E87.8 - OTH DISORDERS OF ELECTROLYTE AND FLUID BALANCE, NEC   

Status: Acute   Current Visit: Yes   





(18) Dysphagia


SNOMED Code(s): 21667491, 165930997


   Code(s): R13.10 - DYSPHAGIA, UNSPECIFIED   Status: Acute   Current Visit: Yes

  





- Problem List Review


Problem List Initiated/Reviewed/Updated: Yes





- Assessment


Assessment:: 





9/11/20


80 yo male presents to ED via [place] ambulance for worsening confusion, fever,

and generalized weakness


Also note a history of coffee ground stools at SNF


In ED patient will nod yes or no but mumbles incomprehensive answers to 

questions


UA shows UTI


Sepsis criteria: UIT, Afebrile in ED, Leukocytosis, Not tachypneic or 

tachycardic 


Does not meet sepsis criteria


Labs:


WBC 13.46


Hgb 15.1


Neutrophils 83%


Sodium 148


Potassium 4.4


Creatinine 1.2


GFR 59


CRP 17.5


D-Dimer 3.39


Lactic acid 1.5


Urine cultures and blood cultures ordered 


Started on rocephin in ED - continue


IV 1L bolus given in ED


PLAN


IV fluids as ordered


PRN antiemetics


Tylenol for fevers


Continue IV Rocephin


Urine cultures and blood cultures pending


PT/OT


CM/SW for discharge planning


NPO for now until nursing swallow screen


Bedrest for now


Phenytoin level pending 


Lovenox 1mg/kg


No acute concerns


Dietician consult


Hold lisinopril for now


Continue home phenytoin and primidone


Check phenytoin level





9/12/20


No complaints overnight


Vital signs:


Blood pressure: 97-1 128/47 240


T-max 99 degrees


Heart rate: 78-94


Pulse ox greater than 92% on room air


Lab results


WBC down from 13.46-11.42


Platelets down from 190-262


Lymphocytes down from 1346-920


Sodium up from 148 253


Chloride up from 109-117


GFR up from 59 to greater than 60


Water deficit 2,790 mL's








- Plan


Plan:: 





UTI (urinary tract infection)


Thrombocytopenia/Lymphopenia


Rocephin day 2


Follow-up on cultures from admission


Discontinue IV fluids once tolerating p.o.


Tylenol PRN for fever


Trend temperature and panculture if febrile





Pulmonary embolus, right


Discontinue Lovenox


Start Xarelto





Dysphagia


Continue dysphagia diet


Continue speech therapy





Fall


Altered mental status


Generalized muscle weakness


Severe muscle deconditioning


Recurrent falls while walking


Hypoalbuminemia


Prealbumin level


Vitamin levels


Dietary consult





Epilepsy 


Continue phenytoin and primidone





Hypernatremia


Hyperchloremia


D5LR 1L bolus


D5LR at 125ml/hr





Hypertension


Continue to hold lisinopril





CODE STATUS: DNR/DNI





PROPHYLAXIS


DVT: Xarelto


GI: Not indicated





DISPOSITION:


Patient will remain admitted for anticoagulation, fluid restriction stabilize 

electrolytes and vital signs monitored sedation as well as PT OT evaluation.

## 2020-09-13 RX ADMIN — PHENYTOIN SODIUM SCH MG: 100 CAPSULE ORAL at 17:29

## 2020-09-13 RX ADMIN — EXTENDED PHENYTOIN SODIUM SCH MG: 30 CAPSULE ORAL at 17:29

## 2020-09-13 RX ADMIN — PHENYTOIN SODIUM SCH MG: 100 CAPSULE ORAL at 08:23

## 2020-09-13 NOTE — PCM.PN
- General Info


Date of Service: 09/13/20


Subjective Update: 





1 bowel movement this morning, no longer loose


Yesterday had 4 bowel movements


Slept through the night


Is eating about 50% more than yesterday


Is on room air








- Patient Data


Vitals - Most Recent: 


                                Last Vital Signs











Temp  98.6 F   09/13/20 05:40


 


Pulse  80   09/13/20 05:40


 


Resp  15   09/13/20 05:40


 


BP  104/71   09/13/20 05:40


 


Pulse Ox  96   09/13/20 05:40








Weight - Most Recent: 67.54 kg





- Exam


General: Cooperative, No Acute Distress, Lethargic


HEENT: Mucous Membr. Moist/Pink


Neck: Supple, Trachea Midline, No JVD, No Thyromegaly, +2 Carotid Pulse wo 

Bruit.  No: Lymphadenopathy


Lungs: Clear to Auscultation, Normal Respiratory Effort, Decreased Breath 

Sounds.  No: Crackles, Rales, Rhonchi, Rub, Stridor, Wheezing


Cardiovascular: Regular Rate, Regular Rhythm.  No: Murmurs, Gallops, Rubs


GI/Abdominal Exam: Normal Bowel Sounds, Soft, Non-Tender.  No: Distended, 

Guarding, Rigid, Rebound


Extremities: Normal Inspection


Peripheral Pulses: 2+: Radial (L), Radial (R)


Skin: Warm, Dry


Neurological: No New Focal Deficit





Sepsis Event Note





- Evaluation


Sepsis Screening Result: No Definite Risk





- Problem List & Annotations


(1) UTI (urinary tract infection)


SNOMED Code(s): 84749727


   Code(s): N39.0 - URINARY TRACT INFECTION, SITE NOT SPECIFIED   Status: Acute 

 Priority: High   Current Visit: No   


Qualifiers: 


   Urinary tract infection type: acute cystitis   Hematuria presence: with 

hematuria   Qualified Code(s): N30.01 - Acute cystitis with hematuria   





(2) Hypoalbuminemia


SNOMED Code(s): 703233108


   Code(s): E88.09 - OTH DISORDERS OF PLASMA-PROTEIN METABOLISM, NEC   Status: 

Acute   Current Visit: Yes   





(3) Blindness of left eye


SNOMED Code(s): 506964009


   Code(s): H54.40 - BLINDNESS, ONE EYE, UNSPECIFIED EYE   Status: Chronic   

Priority: Low   Current Visit: No   


Qualifiers: 


   Right eye visual impairment category: right - category 2   Qualified Code(s):

H54.3 - Unqualified visual loss, both eyes   





(4) Fall


SNOMED Code(s): 1785349, 517191913


   Code(s): W19.XXXA - UNSPECIFIED FALL, INITIAL ENCOUNTER   Status: Acute   

Current Visit: No   


Qualifiers: 


   Encounter type: initial encounter   Qualified Code(s): W19.XXXA - Unspecified

fall, initial encounter   





(5) Generalized muscle weakness


SNOMED Code(s): 12668166, 15964639


   Code(s): M62.81 - MUSCLE WEAKNESS (GENERALIZED)   Status: Acute   Current 

Visit: No   





(6) History of seizures


SNOMED Code(s): 736106297


   Code(s): Z87.898 - PERSONAL HISTORY OF OTHER SPECIFIED CONDITIONS   Status: 

Chronic   Priority: Medium   Current Visit: No   





(7) Hypertension


SNOMED Code(s): 30626260


   Code(s): I10 - ESSENTIAL (PRIMARY) HYPERTENSION   Status: Chronic   Priority:

Medium   Current Visit: No   


Qualifiers: 


   Hypertension type: unspecified   Qualified Code(s): I10 - Essential (primary)

hypertension   





(8) Physical deconditioning


SNOMED Code(s): 50867001242846


   Code(s): R53.81 - OTHER MALAISE   Status: Acute   Current Visit: No   





(9) Pulmonary embolus, right


SNOMED Code(s): 56235735


   Code(s): I26.99 - OTHER PULMONARY EMBOLISM WITHOUT ACUTE COR PULMONALE   

Status: Acute   Priority: High   Current Visit: No   





(10) Recurrent falls while walking


SNOMED Code(s): 565508042, 761410076


   Code(s): R29.6 - REPEATED FALLS   Status: Acute   Current Visit: No   





(11) Severe muscle deconditioning


SNOMED Code(s): 609557740


   Code(s): R29.898 - [facility] SYMPTOMS AND SIGNS INVOLVING THE MUSCULOSKELETAL 

SYSTEM   Status: Chronic   Priority: Medium   Current Visit: No   





(12) Epilepsy


SNOMED Code(s): 73619637


   Code(s): G40.909 - EPILEPSY, UNSP, NOT INTRACTABLE, WITHOUT STATUS 

EPILEPTICUS   Status: Acute   Current Visit: Yes   





(13) Altered mental status


SNOMED Code(s): 626893785


   Code(s): R41.82 - ALTERED MENTAL STATUS, UNSPECIFIED   Status: Acute   

Current Visit: Yes   





(14) Thrombocytopenia


SNOMED Code(s): 526636412


   Code(s): D69.6 - THROMBOCYTOPENIA, UNSPECIFIED   Status: Acute   Current V

isit: Yes   





(15) Lymphopenia


Status: Acute   Current Visit: Yes   





(16) Hypernatremia


SNOMED Code(s): 859850804


   Code(s): E87.0 - HYPEROSMOLALITY AND HYPERNATREMIA   Status: Acute   Current 

Visit: Yes   





(17) Hyperchloremia


SNOMED Code(s): 55900662


   Code(s): E87.8 - OTH DISORDERS OF ELECTROLYTE AND FLUID BALANCE, NEC   

Status: Acute   Current Visit: Yes   





(18) Dysphagia


SNOMED Code(s): 03345807, 819373598


   Code(s): R13.10 - DYSPHAGIA, UNSPECIFIED   Status: Acute   Current Visit: Yes

  





- Problem List Review


Problem List Initiated/Reviewed/Updated: Yes





- Assessment


Assessment:: 





9/11/20


78 yo [facility] for [facility] confusion, fever,

and generalized weakness


Also note a history of coffee ground stools at SNF


In ED patient will nod yes or no but mumbles incomprehensive answers to 

questions


UA shows UTI


Sepsis criteria: UIT, Afebrile in ED, Leukocytosis, Not tachypneic or 

tachycardic 


Does not meet sepsis criteria


Labs:


WBC 13.46


Hgb 15.1


Neutrophils 83%


Sodium 148


Potassium 4.4


Creatinine 1.2


GFR 59


CRP 17.5


D-Dimer 3.39


Lactic acid 1.5


Urine cultures and blood cultures ordered 


Started on rocephin in ED - continue


IV 1L bolus given in ED


PLAN


IV fluids as ordered


PRN antiemetics


Tylenol for fevers


Continue IV Rocephin


Urine cultures and blood cultures pending


PT/OT


CM/SW for discharge planning


NPO for now until nursing swallow screen


Bedrest for now


Phenytoin level pending 


Lovenox 1mg/kg


No acute concerns


Dietician consult


Hold lisinopril for now


Continue home phenytoin and primidone


Check phenytoin level





9/12/20


No complaints overnight


Vital signs:


Blood pressure: 97-1 128/47 240


T-max 99 degrees


Heart rate: 78-94


Pulse ox greater than 92% on room air


Lab results


WBC down from 13.46-11.42


Platelets down from 190-262


Lymphocytes down from 1346-920


Sodium up from 148 253


Chloride up from 109-117


GFR up from 59 to greater than 60


Water deficit 2,790 mL's


PLAN


Rocephin day 2


Follow-up on cultures from admission


Discontinue IV fluids once tolerating p.o.


Tylenol PRN for fever


Trend temperature and panculture if febrile


Discontinue Lovenox


Start Xarelto


Continue dysphagia diet


Continue speech therapy


Prealbumin level


Vitamin levels


Dietary consult


Continue phenytoin and primidone


D5LR 1L bolus


D5LR at 125ml/hr


Continue to hold lisinopril


Patient will remain admitted for anticoagulation, fluid restriction stabilize 

electrolytes and vital signs monitored sedation as well as PT OT evaluation.





9/13/20


Slept through the night


Has not complained to nursing about anything


Stools are no longer loose


On room air pulse ox is been greater than 90%


Vital signs trend


Blood pressure 85 206/40 9-61


T-max 99.3


Heart rate 72 to 80 bpm


Pulse ox greater than 90% on room air


Lab results


WBC down from 11.42-8.87


Platelets stable at 162


Sodium down from 153 251


Chloride down from 77-1 15


Phosphate down from 3.1-2.3


Magnesium down from 2.1-1.8


Procalcitonin from 9/11+ at 0.2


Blood cultures negative x2 days








- Plan


Plan:: 





UTI (urinary tract infection)


Thrombocytopenia/Lymphopenia


Rocephin day 3


Follow-up on cultures from admission


Discontinue IV fluids once tolerating p.o.


Tylenol PRN for fever


Trend temperature and panculture if febrile





Pulmonary embolus, right


Continue Xarelto





Dysphagia


Continue dysphagia diet


Continue speech therapy





Fall


Altered mental status


Generalized muscle weakness


Severe muscle deconditioning


Recurrent falls while walking


Hypoalbuminemia


Prealbumin level


Vitamin levels


Dietary consult





Epilepsy 


Continue phenytoin and primidone





Hypernatremia


Hyperchloremia


D5LR at 125ml/hr





Hypertension


Continue to hold lisinopril





CODE STATUS: DNR/DNI





PROPHYLAXIS


DVT: Xarelto


GI: Not indicated





DISPOSITION:


Patient will remain admitted for anticoagulation, fluid restriction stabilize 

electrolytes and vital signs monitored sedation as well as PT OT evaluation.

## 2020-09-14 LAB — 25(OH)D3 SERPL-MCNC: 10 NG/ML (ref 30–100)

## 2020-09-14 RX ADMIN — PHENYTOIN SODIUM SCH MG: 100 CAPSULE ORAL at 08:50

## 2020-09-14 NOTE — [id number]
**Discharge Summary





- Hospital Course


HPI Initial Comments: 


Patient is a 79-year-old male who presents via [place] ambulance due to 

generalized weakness and decreased mentation.  He was seen 3 days prior for 

similar symptoms and nothing was found during that work-up.  Per the ED provider

staff report that the patient was admitted there on August 27 and he has been 

slowly deteriorating.  He was able to ambulate on admission but today he is not 

even able to hold his head up.  They note he had a fever of 102 F yesterday and

there were concerns of a possible watery coffee-ground stools, although this was

after a suppository.  ED provider notes that the patient was alert enough to 

make eye contact and would shake his head yes or no.  They report he will 

attempt to answer questions but is quite garbled.  There is no focal 

neurological abnormalities noted.





In the ED temp is 97 2.  Pulse 81.  Respirations 18.  Blood pressure 96/69.  

Oxygen saturation 94% on room air.  Twelve-lead EKG is obtained showing a sinus 

rhythm at 77 bpm with no obvious ischemia or acute changes noted.  Labs are 

obtained showing leukocytosis at 13.46 with 83% neutrophils.  Sodium is high at 

148.  Potassium 4.4.  Creatinine is 1.2 with GFR 59.  CRP 17.5.  Albumin is low 

at 3.1.  D-dimer is obtained and is 3.39.  Troponin is negative at less than 

0.017.  LDH is 206.  Ferritin is 285.  Magnesium 2.4.  Lactic acid is 1.5.  UA 

is obtained and shows concentrated urine with trace protein, trace intact blood,

positive nitrite, 2+ bilirubin, 5-10 RBCs, 0-5 WBCs, and many bacteria noted.  

SARS COVID screen is negative.  Nursing notes that the patient did have a bowel 

movement and it was brown and normal-looking with no signs of melena or 

hematochezia.  He started on 2 g of Rocephin for his apparent UTI.  Due to 

elevated d-dimer CTA is obtained showing several small pulmonary emboli on the 

right side.  Bibasilar atelectasis worse on the right is also noted.  Head CT is

obtained showing senescent change catheters are noted on both sides which are 

extra-axial in location and stable and nothing acute is appreciated.  Chest 

x-ray shows nothing acute.





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  He resides at 

[facility]. His PCP is Dr. Ojeda.





Diagnosis: Stroke: No





- Discharge Data


Discharge Date: 09/14/20 (Admit date: 9/11/20)


Discharge Disposition: DC/Tfer to SNF 03


Condition: Good





- Referral to Home Health


Primary Care Physician: 


Wallace Ojeda MD








- Discharge Diagnosis/Problem(s)


(1) Generalized weakness


SNOMED Code(s): 08232145


   ICD Code: R53.1 - WEAKNESS   Status: Acute   Priority: High   Current Visit: 

No   





(2) Mental status, decreased


SNOMED Code(s): 885021292


   ICD Code: R41.82 - ALTERED MENTAL STATUS, UNSPECIFIED   Status: Acute   

Priority: High   Current Visit: No   





(3) Pulmonary embolus, right


SNOMED Code(s): 58034679


   ICD Code: I26.99 - OTHER PULMONARY EMBOLISM WITHOUT ACUTE COR PULMONALE   

Status: Acute   Priority: High   Current Visit: No   





(4) UTI (urinary tract infection)


SNOMED Code(s): 48098009


   ICD Code: N39.0 - URINARY TRACT INFECTION, SITE NOT SPECIFIED   Status: Acute

  Priority: High   Current Visit: No   


Qualifiers: 


   Urinary tract infection type: acute cystitis   Hematuria presence: with 

hematuria   Qualified Code(s): N30.01 - Acute cystitis with hematuria   





(5) Blindness of left eye


SNOMED Code(s): 832816591


   ICD Code: H54.40 - BLINDNESS, ONE EYE, UNSPECIFIED EYE   Status: Chronic   

Priority: Low   Current Visit: No   


Qualifiers: 


   Right eye visual impairment category: right - category 2   Qualified Code(s):

H54.3 - Unqualified visual loss, both eyes   





(6) Osteoarthritis of hips, bilateral


SNOMED Code(s): 543567712087093


   ICD Code: M16.0 - BILATERAL PRIMARY OSTEOARTHRITIS OF HIP   Status: Chronic  

Priority: Low   Current Visit: No   


Qualifiers: 


   Osteoarthritis type: unspecified   Qualified Code(s): M16.0 - Bilateral 

primary osteoarthritis of hip   





(7) Osteoarthritis of knees, bilateral


SNOMED Code(s): 931604844461205


   ICD Code: M17.0 - BILATERAL PRIMARY OSTEOARTHRITIS OF KNEE   Status: Chronic 

 Priority: Low   Current Visit: No   


Qualifiers: 


   Osteoarthritis type: unspecified   Qualified Code(s): M17.0 - Bilateral 

primary osteoarthritis of knee   





(8) Severe muscle deconditioning


SNOMED Code(s): 327902168


   ICD Code: R29.898 - OTH SYMPTOMS AND SIGNS INVOLVING THE MUSCULOSKELETAL 

SYSTEM   Status: Chronic   Priority: Medium   Current Visit: No   





(9) Hypertension


SNOMED Code(s): 67519511


   ICD Code: I10 - ESSENTIAL (PRIMARY) HYPERTENSION   Status: Chronic   

Priority: Medium   Current Visit: No   


Qualifiers: 


   Hypertension type: unspecified   Qualified Code(s): I10 - Essential (primary)

hypertension   





(10) History of seizures


SNOMED Code(s): 537535697


   ICD Code: Z87.898 - PERSONAL HISTORY OF OTHER SPECIFIED CONDITIONS   Status: 

Chronic   Priority: Medium   Current Visit: No   





(11) Elevated d-dimer


SNOMED Code(s): 775118800


   ICD Code: R79.89 - OTHER SPECIFIED ABNORMAL FINDINGS OF BLOOD CHEMISTRY   

Status: Acute   Priority: High   Current Visit: No   





- Patient Summary/Data


Consults: 


                                  Consultations





09/11/20 14:48


Consult to Case Management/ [CONS] Routine 


Consult to Spiritual Care [CONS] Routine 


OT Evaluation and Treatment [CONS] Routine 


PT Evaluation and Treatment [CONS] Routine 





09/11/20 15:16


Consult to Dietician [CONS] Routine 











Labs Pending at D/C: 


Urine culture - preliminary from microbiology showed good susceptibility to 

Rocephin and Keflex. 





Recommended Follow-up Testing/Procedures: 


Follow-up with PCP within 7-10 days of discharge


   -Recommend repeat CBC, CMP, magnesium at that visit


   -Recommend re-check vitamin D, folic acid, and vitamin B12 in 1-2 months


Hospital Course: 


Chuck was admitted to the hospital floor due to UTI, weakness, and right-sided 

pulmonary embolus.  He was started on twice daily Lovenox and later transitioned

 to Xarelto.  He should take 15 mg twice daily Xarelto for a total of 21 days 

and then transition to once daily 20 mg Xarelto.  He was also started on 

Rocephin in the ED and this continued on the floor.  This was switched to p.o. 

Keflex 500 mg twice daily for total of 7 days of IV antibiotic treatment.  Urine

 cultures were not formally back although per microbiology urine was showing 

good susceptibilities to both Keflex and Rocephin.  He received IV fluids.  

White count did trend down.  Procalcitonin was 0.20.  He had no fevers.  Blood 

cultures remain negative. He was noted to be hypernatremic which initially elev

ated on admission and then came down to 147.  Phenytoin level was obtained and 

was 13.7.  Kidney function remained stable.  Lactic acid was within normal 

limits.  Magnesium was 1.7 and this was supplemented prior to discharge.  He 

will be discharged on 4 more days of 400 mg daily magnesium.  Vitamin B12 was 

obtained and was quite low at 174.  This will be supplemented at discharge with 

1mg daily.  Vitamin D was also low at 10.0 and will be supplemented at discharge

 with 5000 units daily.  Folate was very low at 3.3 and this will be 

supplemented with 1mg at discharge.  COVID-19 screen was obtained as requirement

 of nursing home facility and was negative prior to discharge.  He will be 

discharged back to [facility] today.  Recommend follow-up with 

primary care provider within 7 to 10 days of discharge.  Recommend recheck CBC, 

CMP, magnesium at that appointment.  Recommend recheck vitamin D, B12, and folic

 acid within 1 to 2 months of discharge.  As mentioned patient was started on 

DVT treatment protocol for Xarelto and this will to be transitioned after 21 

days.  Recommend continue PT at [facility].  All other home medications were continued 

at discharge.

















- Patient Instructions


Diet, Other: NDD3 diet with thickened liquids 


Activity: As Tolerated


Driving: Do Not Drive


Notify Provider of: Fever, Increased Pain, Nausea and/or Vomiting


Other/Special Instructions: Follow-up with primary care provider withint 7-10 

days of discharge.  Resume home medications as directed.  You were started on 

xarelto (a blood thinner) for your pulmonary embolism. You should take this 

twice daily for the next 19 days and then switch to 20mg daily. Your primary 

care provider can assist you with this dosing.  Your magnesium was low and was 

supplemented here. You will be prescribed a few more days of this on discharge. 

Your primary care provider should follow-up with this.  Take all new medications

 as prescribed.  You were prescirbed and antibiotic. Take this until it is gone,

 even if you feel 100% better.  Continue PT/OT at [facility].  Should symtpoms return 

or worsen, contact primary care provider or return to the Emergency Department.





- Discharge Plan


*PRESCRIPTION DRUG MONITORING PROGRAM REVIEWED*: No


*COPY OF PRESCRIPTION DRUG MONITORING REPORT IN PATIENT MOHAN: No


Prescriptions/Med Rec: 


Folic Acid 1 mg PO DAILY #20 tablet


cephALEXin [Keflex] 500 mg PO BID #6 capsule


Magnesium Oxide [Magnesium] 400 mg PO DAILY #4 tablet


Cyanocobalamin (Vitamin B12) [Vitamin B12] 1,000 mcg PO DAILY #20 tablet


Cholecalciferol (Vitamin D3) [Vitamin D3] 5,000 unit PO DAILY #20 tablet


Rivaroxaban [Xarelto] 15 mg PO BID #38 tablet


Home Medications: 


                                    Home Meds





Phenytoin Sodium Extended [Dilantin] 100 mg PO QAM 08/11/20 [History]


Primidone [Mysoline] 250 mg PO BID 08/11/20 [History]


lisinopriL [Lisinopril] 20 mg PO DAILY 08/11/20 [History]


Phenytoin Sodium Extended [Dilantin] 150 mg PO QPM 08/15/20 [History]


Cholecalciferol (Vitamin D3) [Vitamin D3] 5,000 unit PO DAILY #20 tablet 

09/14/20 [Rx]


Cyanocobalamin (Vitamin B12) [Vitamin B12] 1,000 mcg PO DAILY #20 tablet 

09/14/20 [Rx]


Folic Acid 1 mg PO DAILY #20 tablet 09/14/20 [Rx]


Magnesium Oxide [Magnesium] 400 mg PO DAILY #4 tablet 09/14/20 [Rx]


Rivaroxaban [Xarelto] 15 mg PO BID #38 tablet 09/14/20 [Rx]


cephALEXin [Keflex] 500 mg PO BID #6 capsule 09/14/20 [Rx]








Oxygen Therapy Mode: Room Air


Patient Handouts:  Rivaroxaban oral tablets, Sepsis, Diagnosis, Adult, Pulmonary

 Embolism, Venous Thromboembolism Prevention


Referrals: 


Wallace Ojeda MD [Primary Care Provider] - 09/21/20 8:15 am (Please follow up 

with Dr. Ojeda on September 21 at 8:15.)





- Discharge Summary/Plan Comment


DC Time >30 min.: Yes (60 mins )





- General Info


Date of Service: 09/14/20


Admission Dx/Problem (Free Text: 


                           Admission Diagnosis/Problem





Admission Diagnosis/Problem      UTI, Urinary tract infectious disease








Functional Status: Reports: Pain Controlled, Tolerating Diet, Urinating.  

Denies: Ambulating, New Symptoms





- Review of Systems


General: Reports: No Symptoms, Weakness.  Denies: Fever, Fatigue, Malaise, 

Chills


HEENT: Reports: No Symptoms


Pulmonary: Reports: No Symptoms.  Denies: Shortness of Breath, Cough


Cardiovascular: Reports: No Symptoms.  Denies: Chest Pain


Gastrointestinal: Reports: No Symptoms.  Denies: Abdominal Pain, Constipation, 

Diarrhea, Nausea, Vomiting


Genitourinary: Reports: No Symptoms.  Denies: Pain


Musculoskeletal: Reports: No Symptoms


Skin: Reports: No Symptoms.  Denies: Cyanosis


Neurological: Reports: Difficulty Walking, Weakness, Gait Disturbance


Psychiatric: Reports: No Symptoms





- Patient Data


Vitals - Most Recent: 


                                Last Vital Signs











Temp  99.0 F   09/14/20 08:25


 


Pulse  64   09/14/20 08:25


 


Resp  20   09/14/20 08:25


 


BP  129/64   09/14/20 08:25


 


Pulse Ox  93 L  09/14/20 08:25








                                        





Orthostatic Blood Pressure [     93/49


Sitting]                         


Orthostatic Blood Pressure [     85/61


Supine]                          








Weight - Most Recent: 151 lb 11.2 oz


I&O - Last 24 hours: 


                                 Intake & Output











 09/13/20 09/14/20 09/14/20





 22:59 06:59 14:59


 


Intake Total 2205 1485 


 


Output Total 724  


 


Balance 1481 1485 











Lab Results - Last 24 hrs: 


                         Laboratory Results - last 24 hr











  09/14/20 09/14/20 09/14/20 Range/Units





  06:10 06:10 06:10 


 


WBC  7.06    (4.23-9.07)  K/mm3


 


RBC  4.10 L    (4.63-6.08)  M/mm3


 


Hgb  12.2 L    (13.7-17.5)  gm/dl


 


Hct  39.8 L    (40.1-51.0)  %


 


MCV  97.1 H    (79.0-92.2)  fl


 


MCH  29.8    (25.7-32.2)  pg


 


MCHC  30.7 L    (32.2-35.5)  g/dl


 


RDW Std Deviation  48.6 H    (35.1-43.9)  fL


 


Plt Count  159 L    (163-337)  K/mm3


 


MPV  12.7 H    (9.4-12.3)  fl


 


Neut % (Auto)  73.6 H    (34.0-67.9)  %


 


Lymph % (Auto)  12.0 L    (21.8-53.1)  %


 


Mono % (Auto)  11.5    (5.3-12.2)  %


 


Eos % (Auto)  2.5    (0.8-7.0)  


 


Baso % (Auto)  0.1    (0.1-1.2)  %


 


Neut # (Auto)  5.19    (1.78-5.38)  K/mm3


 


Lymph # (Auto)  0.85 L    (1.32-3.57)  K/mm3


 


Mono # (Auto)  0.81    (0.30-0.82)  K/mm3


 


Eos # (Auto)  0.18    (0.04-0.54)  K/mm3


 


Baso # (Auto)  0.01    (0.01-0.08)  K/mm3


 


Sodium   147 H   (136-145)  mEq/L


 


Potassium   4.0   (3.5-5.1)  mEq/L


 


Chloride   112 H   ()  mEq/L


 


Carbon Dioxide   30   (21-32)  mEq/L


 


Anion Gap   9.0   (5-15)  


 


BUN   15   (7-18)  mg/dL


 


Creatinine   0.6 L   (0.7-1.3)  mg/dL


 


Est Cr Clr Drug Dosing   98.76   mL/min


 


Estimated GFR (MDRD)   > 60   (>60)  mL/min


 


BUN/Creatinine Ratio   25.0 H   (14-18)  


 


Glucose   125 H   ()  mg/dL


 


Calcium   7.9 L   (8.5-10.1)  mg/dL


 


Phosphorus   2.4 L   (2.6-4.7)  mg/dL


 


Magnesium   1.7 L   (1.8-2.4)  mg/dl


 


Vitamin B12    174 L  (193-986)  pg/ml


 


Vitamin D 25-Hydroxy   10.0 L   (30.0-100.0)  ng/ml


 


Folate    3.3 L  (8.6-58.9)  ng/mL


 


COVID-19 (SOPHIA)     (NEGATIVE)  














  09/14/20 Range/Units





  09:23 


 


WBC   (4.23-9.07)  K/mm3


 


RBC   (4.63-6.08)  M/mm3


 


Hgb   (13.7-17.5)  gm/dl


 


Hct   (40.1-51.0)  %


 


MCV   (79.0-92.2)  fl


 


MCH   (25.7-32.2)  pg


 


MCHC   (32.2-35.5)  g/dl


 


RDW Std Deviation   (35.1-43.9)  fL


 


Plt Count   (163-337)  K/mm3


 


MPV   (9.4-12.3)  fl


 


Neut % (Auto)   (34.0-67.9)  %


 


Lymph % (Auto)   (21.8-53.1)  %


 


Mono % (Auto)   (5.3-12.2)  %


 


Eos % (Auto)   (0.8-7.0)  


 


Baso % (Auto)   (0.1-1.2)  %


 


Neut # (Auto)   (1.78-5.38)  K/mm3


 


Lymph # (Auto)   (1.32-3.57)  K/mm3


 


Mono # (Auto)   (0.30-0.82)  K/mm3


 


Eos # (Auto)   (0.04-0.54)  K/mm3


 


Baso # (Auto)   (0.01-0.08)  K/mm3


 


Sodium   (136-145)  mEq/L


 


Potassium   (3.5-5.1)  mEq/L


 


Chloride   ()  mEq/L


 


Carbon Dioxide   (21-32)  mEq/L


 


Anion Gap   (5-15)  


 


BUN   (7-18)  mg/dL


 


Creatinine   (0.7-1.3)  mg/dL


 


Est Cr Clr Drug Dosing   mL/min


 


Estimated GFR (MDRD)   (>60)  mL/min


 


BUN/Creatinine Ratio   (14-18)  


 


Glucose   ()  mg/dL


 


Calcium   (8.5-10.1)  mg/dL


 


Phosphorus   (2.6-4.7)  mg/dL


 


Magnesium   (1.8-2.4)  mg/dl


 


Vitamin B12   (193-986)  pg/ml


 


Vitamin D 25-Hydroxy   (30.0-100.0)  ng/ml


 


Folate   (8.6-58.9)  ng/mL


 


COVID-19 (SOPHIA)  Negative  (NEGATIVE)  











KHADAR Results - Last 24 hrs: 


                                  Microbiology











 09/11/20 12:03 Aerobic Blood Culture - Preliminary





 Blood - Venous    NO GROWTH AFTER 2 DAYS





 Anaerobic Blood Culture - Preliminary





    NO GROWTH AFTER 2 DAYS


 


 09/11/20 12:20 Aerobic Blood Culture - Preliminary





 Blood - Venous - Lab Draw    NO GROWTH AFTER 2 DAYS





 Anaerobic Blood Culture - Preliminary





    NO GROWTH AFTER 2 DAYS











Med Orders - Current: 


                               Current Medications





Acetaminophen (Tylenol)  650 mg PO Q4H PRN


   PRN Reason: Pain (Mild 1-3)/fever


Cholecalciferol (Vitamin D3)  5,000 unit PO DAILY LUIS A


   Last Admin: 09/14/20 08:50 Dose:  5,000 unit


   Documented by: 


Cyanocobalamin (Vitamin B12)  1,000 mcg PO DAILY [facility]


   Last Admin: 09/14/20 08:50 Dose:  1,000 mcg


   Documented by: 


Folic Acid (Folic Acid)  1 mg PO DAILY [facility]


   Last Admin: 09/14/20 08:50 Dose:  1 mg


   Documented by: 


Ceftriaxone Sodium 1 gm/ (Sodium Chloride)  100 mls @ 200 mls/hr IV Q24H [facility]


   Last Admin: 09/13/20 17:28 Dose:  200 mls/hr


   Documented by: 


Dextrose/Lactated Ringer's (Dextrose 5%-Lactated Ringers)  1,000 mls @ 125 

mls/hr IV ASDIRECTED [facility]


   Last Admin: 09/14/20 05:15 Dose:  125 mls/hr


   Documented by: 


Ondansetron HCl (Zofran)  4 mg IV Q6H PRN


   PRN Reason: Nausea/Vomiting


Phenytoin Sodium (Phenytoin)  100 mg PO QAM [facility]


   Last Admin: 09/14/20 08:50 Dose:  100 mg


   Documented by: 


Phenytoin Sodium (Phenytoin)  100 mg PO QPM [facility]


   Last Admin: 09/13/20 17:29 Dose:  100 mg


   Documented by: 


Phenytoin Sodium (Dilantin)  60 mg PO QPM [facility]


   Last Admin: 09/13/20 17:29 Dose:  60 mg


   Documented by: 


Primidone (Mysoline)  250 mg PO BID [facility]


   Last Admin: 09/14/20 08:50 Dose:  250 mg


   Documented by: 


Rivaroxaban (Xarelto)  15 mg PO BID [facility]


   Last Admin: 09/14/20 08:50 Dose:  15 mg


   Documented by: 


Sodium Chloride (Saline Flush)  10 ml FLUSH ASDIRECTED PRN


   PRN Reason: Keep Vein Open


   Last Admin: 09/11/20 11:21 Dose:  10 ml


   Documented by: 





Discontinued Medications





Enoxaparin Sodium (Lovenox)  70 mg SUBCUT Q12HR [facility]


   Last Admin: 09/12/20 08:38 Dose:  70 mg


   Documented by: 


Sodium Chloride (Normal Saline)  1,000 mls @ 999 mls/hr IV BOLUS ONE; Protocol


   Stop: 09/11/20 12:14


   Last Admin: 09/11/20 11:21 Dose:  999 mls/hr


   Documented by: 


Ceftriaxone Sodium 2 gm/ (Sodium Chloride)  100 mls @ 200 mls/hr IV ONETIME ONE


   Stop: 09/11/20 13:35


   Last Admin: 09/11/20 13:27 Dose:  200 mls/hr


   Documented by: 


Sodium Chloride (Normal Saline)  1,000 mls @ 200 mls/hr IV ONETIME ONE


   Stop: 09/11/20 18:32


   Last Admin: 09/11/20 14:01 Dose:  200 mls/hr


   Documented by: 


Sodium Chloride (Normal Saline)  100 mls @ 75 mls/hr IV ASDIRECTED [facility]


   Last Admin: 09/11/20 13:45 Dose:  75 mls/hr


   Documented by: 


Ceftriaxone Sodium 1 gm/ (Sodium Chloride)  100 mls @ 200 mls/hr IV Q24H [facility]


   Last Admin: 09/11/20 16:55 Dose:  Not Given


   Documented by: 


Lactated Ringer's (Ringers, Lactated)  1,000 mls @ 75 mls/hr IV ASDIRECTED [facility]


   Last Admin: 09/12/20 09:37 Dose:  75 mls/hr


   Documented by: 


Dextrose/Lactated Ringer's (Dextrose 5%-Lactated Ringers)  1,000 mls @ 999 

mls/hr IV ASDIRECTED [facility]


   Stop: 09/12/20 18:46


   Last Admin: 09/12/20 17:56 Dose:  999 mls/hr


   Documented by: 


Iopamidol (Isovue-370 (76%))  100 ml IVPUSH ONETIME ONE


   Stop: 09/11/20 13:45


   Last Admin: 09/11/20 13:45 Dose:  100 ml


   Documented by: 


Magnesium Oxide (Magnesium Oxide)  800 mg PO ONETIME ONE


   Stop: 09/14/20 10:27


Phenytoin Sodium (Phenytoin)  150 mg PO QPM [facility]


   Last Admin: 09/11/20 18:21 Dose:  100 mg


   Documented by: 











- Exam


Quality Assessment: Reports: DVT Prophylaxis


General: Reports: Alert, Cooperative, No Acute Distress


HEENT: Reports: Pupils Equal, Pupils Reactive, Mucous Membr. Moist/Pink


Neck: Reports: Supple, Trachea Midline


Lungs: Reports: Clear to Auscultation, Normal Respiratory Effort, Decreased 

Breath Sounds


Cardiovascular: Reports: Regular Rate, Regular Rhythm


GI/Abdominal Exam: Normal Bowel Sounds, Soft, Non-Tender


 (Male) Exam: Deferred


Rectal (Males) Exam: Deferred


Back Exam: Reports: Normal Inspection, Decreased Range of Motion


Extremities: Normal Inspection, No Pedal Edema, Limited Range of Motion


Skin: Reports: Warm, Dry, Intact


Neurological: Reports: No New Focal Deficit


Psy/Mental Status: Reports: Alert